# Patient Record
Sex: FEMALE | Race: BLACK OR AFRICAN AMERICAN | Employment: FULL TIME | URBAN - METROPOLITAN AREA
[De-identification: names, ages, dates, MRNs, and addresses within clinical notes are randomized per-mention and may not be internally consistent; named-entity substitution may affect disease eponyms.]

---

## 2017-08-17 ENCOUNTER — OFFICE VISIT (OUTPATIENT)
Dept: FAMILY MEDICINE CLINIC | Age: 32
End: 2017-08-17

## 2017-08-17 VITALS
HEART RATE: 66 BPM | OXYGEN SATURATION: 100 % | WEIGHT: 200 LBS | HEIGHT: 63 IN | BODY MASS INDEX: 35.44 KG/M2 | DIASTOLIC BLOOD PRESSURE: 81 MMHG | RESPIRATION RATE: 17 BRPM | TEMPERATURE: 98.6 F | SYSTOLIC BLOOD PRESSURE: 125 MMHG

## 2017-08-17 DIAGNOSIS — D50.9 MICROCYTIC ANEMIA: Primary | ICD-10-CM

## 2017-08-17 DIAGNOSIS — K21.9 GASTROESOPHAGEAL REFLUX DISEASE, ESOPHAGITIS PRESENCE NOT SPECIFIED: ICD-10-CM

## 2017-08-17 DIAGNOSIS — E66.9 OBESITY, UNSPECIFIED OBESITY SEVERITY, UNSPECIFIED OBESITY TYPE: ICD-10-CM

## 2017-08-17 DIAGNOSIS — Z29.9 PREVENTIVE MEASURE: ICD-10-CM

## 2017-08-17 NOTE — PROGRESS NOTES
Chief Complaint   Patient presents with    New Patient     1. Have you been to the ER, urgent care clinic since your last visit? Hospitalized since your last visit? No    2. Have you seen or consulted any other health care providers outside of the [address] [place] [address] since your last visit? Include any pap smears or colon screening.  No

## 2017-08-17 NOTE — PATIENT INSTRUCTIONS
Please contact our office if you have any questions about your visit today. 1.) Try and exercise at least three times a week. 2.) Please return in 1 week for next appointment. Iron Deficiency Anemia: Care Instructions  Your Care Instructions    Anemia means that you do not have enough red blood cells. Red blood cells carry oxygen around your body. When you have anemia, it can make you pale, weak, and tired. Many things can cause anemia. The most common cause is loss of blood. This can happen if you have heavy menstrual periods. It can also happen if you have bleeding in your stomach or bowel. You can also get anemia if you don't have enough iron in your diet or if it's hard for your body to absorb iron. In some cases, pregnancy causes anemia. That's because a pregnant woman needs more iron. Your doctor may do more tests to find the cause of your anemia. If a disease or other health problem is causing it, your doctor will treat that problem. It's important to follow up with your doctor to make sure that your iron level returns to normal.  Follow-up care is a key part of your treatment and safety. Be sure to make and go to all appointments, and call your doctor if you are having problems. It's also a good idea to know your test results and keep a list of the medicines you take. How can you care for yourself at home? · If your doctor recommended iron pills, take them as directed. ¨ Try to take the pills on an empty stomach. You can do this about 1 hour before or 2 hours after meals. But you may need to take iron with food to avoid an upset stomach. ¨ Do not take antacids or drink milk or anything with caffeine within 2 hours of when you take your iron. They can keep your body from absorbing the iron well. ¨ Vitamin C helps your body absorb iron. You may want to take iron pills with a glass of orange juice or some other food high in vitamin C.  ¨ Iron pills may cause stomach problems.  These include heartburn, nausea, diarrhea, constipation, and cramps. It can help to drink plenty of fluids and include fruits, vegetables, and fiber in your diet. ¨ It's normal for iron pills to make your stool a greenish or grayish black. But internal bleeding can also cause dark stool. So it's important to tell your doctor about any color changes. ¨ Call your doctor if you think you are having a problem with your iron pills. Even after you start to feel better, it will take several months for your body to build up its supply of iron. ¨ If you miss a pill, don't take a double dose. ¨ Keep iron pills out of the reach of small children. Too much iron can be very dangerous. · Eat foods with a lot of iron. These include red meat, shellfish, poultry, and eggs. They also include beans, raisins, whole-grain bread, and leafy green vegetables. · Steam your vegetables. This is the best way to prepare them if you want to get as much iron as possible. · Be safe with medicines. Do not take nonsteroidal anti-inflammatory pain relievers unless your doctor tells you to. These include aspirin, naproxen (Aleve), and ibuprofen (Advil, Motrin). · Liquid iron can stain your teeth. But you can mix it with water or juice and drink it with a straw. Then it won't get on your teeth. When should you call for help? Call 911 anytime you think you may need emergency care. For example, call if:  · You passed out (lost consciousness). · You vomit blood or what looks like coffee grounds. · You pass maroon or very bloody stools. Call your doctor now or seek immediate medical care if:  · Your stools are black and look like tar, or they have streaks of blood. · You are dizzy or lightheaded, or you feel like you may faint. Watch closely for changes in your health, and be sure to contact your doctor if:  · Your fatigue and weakness continue or get worse.   · You have side effects from taking iron pills, such as nausea, vomiting, constipation, diarrhea, or heartburn. · You do not get better as expected. Where can you learn more? Go to http://viraj-devora.info/. Enter X731 in the search box to learn more about \"Iron Deficiency Anemia: Care Instructions. \"  Current as of: October 13, 2016  Content Version: 11.3  © 5506-2640 [employer]. Care instructions adapted under license by [employer] (which disclaims liability or warranty for this information). If you have questions about a medical condition or this instruction, always ask your healthcare professional. [place] [address] any warranty or liability for your use of this information.

## 2017-08-17 NOTE — PROGRESS NOTES
History and Physical    Patient: Ximena Cody MRN: [id number]  SSN: [id number]    YOB: 1985  Age: 28 y.o. Sex: female      Subjective: Ximena Cody is a 28 y.o. female who is here to establish care. The patient last saw Dr. Eddie Davis in [place]. She mentions that over the past 5 years she has been dealing with anemia. She mentions that her Hgb is around an 8 or 9. She has been dealing with fibroids for a number of years. She admits to very heavy menses. She mentions that she was on birth control in the past and is unsure of her regular period. She mentions that slow release iron can make her feel very gassy. She states that she will sometimes slack off on the medication. She denies any constipation with the symptoms. She states that she works at [employer]. She mentions that she has been having flares of GERD from time to time. She states that she had Dennice Hint last Monday and it woke her up at night. She mentions that she has not been able to have a healthy diet. She mentions that she does eat late at night. She mentions that she has been slacking on her vegetable intake. She also mentions that she eats out every day. She states that she can break out into hives when she is exposed to Citrus products. She mentions that she only gets to exercise when she is at work. She states that she does not go to the gym or the [facility].        Past Medical History:   Diagnosis Date    Anemia     GERD (gastroesophageal reflux disease)     History of blood product transfusion      Past Surgical History:   Procedure Laterality Date    HX CARPAL TUNNEL RELEASE      bilat    HX MYOMECTOMY      HX RIGHT SALPINGO-OOPHORECTOMY        Family History   Problem Relation Age of Onset    Hypertension Father     Diabetes Father     Diabetes Maternal Grandmother     Hypertension Maternal Grandmother     Arthritis-rheumatoid Maternal Grandfather     Diabetes Paternal Grandmother     Hypertension Paternal Grandmother     Arthritis-rheumatoid Paternal Grandfather      Social History   Substance Use Topics    Smoking status: Never Smoker    Smokeless tobacco: Never Used    Alcohol use Yes      Comment: socially   -   -Works at [employer]        Prior to Admission medications    Not on File        Allergies   Allergen Reactions    Macrobid [Nitrofurantoin Monohyd/M-Cryst] Nausea and Vomiting    Morphine Itching    Vicodin [Hydrocodone-Acetaminophen] Nausea and Vomiting and Other (comments)   - Allergies to grass, pollen and mold. Review of Systems:  Constitutional: negative  Eyes: positive for contacts/glasses  Ears, Nose, Mouth, Throat, and Face: Negative   Respiratory: negative  Cardiovascular: negative  Gastrointestinal: negative  Genitourinary:negative  Integument/Breast: negative  Musculoskeletal:negative  Neurological: negative  Behavioral/Psychiatric: negative  Endocrine: negative    Objective:     Vitals:    08/17/17 1031   BP: 125/81   Pulse: 66   Resp: 17   Temp: 98.6 °F (37 °C)   TempSrc: Oral   SpO2: 100%   Weight: 200 lb (90.7 kg)   Height: 5' 3\" (1.6 m)        Physical Exam:  GENERAL: alert, cooperative, no distress, appears stated age, morbidly obese  EYE: conjunctivae/corneas clear. PERRL, EOM's intact. Fundi benign  LYMPHATIC: Cervical, supraclavicular, and axillary nodes normal.   THROAT & NECK: normal and no erythema or exudates noted. LUNG: clear to auscultation bilaterally  HEART: regular rate and rhythm, S1, S2 normal, no murmur, click, rub or gallop  ABDOMEN: soft, non-tender. Bowel sounds normal. No masses,  no organomegaly  EXTREMITIES:  extremities normal, atraumatic, no cyanosis or edema  SKIN: Normal.  NEUROLOGIC: AOx3. Gait normal. Reflexes and motor strength normal and symmetric. Cranial nerves 2-12 and sensation grossly intact.   PSYCHIATRIC: non focal  RECTAL: normal findings:  Within normal limits    Labs:     Labs ordered below     Assessment: 1.) Microcytic Anemia:Patient will cotinue on low-release iron. CBC w/ diff drawn in the office today. 2.) GERD: Patient instructed to take Zantac OT as needed    3.) Obesity[de-identified] Will discusses weight management on next visit. Patient will return in 1 week for follow up.       Plan:     Orders Placed This Encounter    SED RATE (ESR)    CBC WITH AUTOMATED DIFF    METABOLIC PANEL, COMPREHENSIVE    LIPID PANEL    TSH 3RD GENERATION    T4, FREE    HEMOGLOBIN A1C WITH EAG    VITAMIN D, 25 HYDROXY    MAGNESIUM    PHOSPHORUS    HIV 1/2 AG/AB, 4TH GENERATION,W RFLX CONFIRM                 Signed By: Zena Cr DO     August 17, 2017

## 2017-08-17 NOTE — MR AVS SNAPSHOT
Visit Information Date & Time Provider Department Dept. Phone Encounter #  
 8/17/2017 10:00 AM Frederic ChristoLisa [id number] Follow-up Instructions Return in about 1 week (around 8/24/2017) for Follow up on labs. Upcoming Health Maintenance Date Due DTaP/Tdap/Td series (1 - Tdap) 1/6/2006 PAP AKA CERVICAL CYTOLOGY 1/6/2006 INFLUENZA AGE 9 TO ADULT 8/1/2017 Allergies as of 8/17/2017  Review Complete On: 8/17/2017 By: Frederic Villafuerte DO Severity Noted Reaction Type Reactions Macrobid [Nitrofurantoin Monohyd/m-cryst]  08/17/2017    Nausea and Vomiting Morphine  08/17/2017    Itching Vicodin [Hydrocodone-acetaminophen]  08/17/2017    Nausea and Vomiting, Other (comments) Current Immunizations  Never Reviewed No immunizations on file. Not reviewed this visit You Were Diagnosed With   
  
 Codes Comments Gastroesophageal reflux disease, esophagitis presence not specified    -  Primary ICD-10-CM: K21.9 ICD-9-CM: 530.81 Microcytic anemia     ICD-10-CM: D50.9 ICD-9-CM: 280.9 Obesity, unspecified obesity severity, unspecified obesity type     ICD-10-CM: E66.9 ICD-9-CM: 278.00 Preventive measure     ICD-10-CM: Z29.9 ICD-9-CM: V07.9 Vitals BP Pulse Temp Resp Height(growth percentile) Weight(growth percentile) 125/81 (BP 1 Location: Right arm, BP Patient Position: Sitting) 66 98.6 °F (37 °C) (Oral) 17 5' 3\" (1.6 m) 200 lb (90.7 kg) SpO2 BMI OB Status Smoking Status 100% 35.43 kg/m2 Unknown Never Smoker BMI and BSA Data Body Mass Index Body Surface Area  
 35.43 kg/m 2 2.01 m 2 Your Updated Medication List  
  
Notice  As of 8/17/2017 11:20 AM  
 You have not been prescribed any medications. Follow-up Instructions Return in about 1 week (around 8/24/2017) for Follow up on labs. To-Do List   
 08/17/2017 Lab:  HEMOGLOBIN A1C WITH EAG   
  
 08/17/2017 Lab:  HIV 1/2 AG/AB, 4TH GENERATION,W RFLX CONFIRM   
  
 08/17/2017 Lab:  MAGNESIUM   
  
 08/17/2017 Lab:  PHOSPHORUS   
  
 08/17/2017 Lab:  VITAMIN D, 25 HYDROXY Around 11/15/2017 Lab:  CBC WITH AUTOMATED DIFF Around 11/15/2017 Lab:  LIPID PANEL Around 11/15/2017 Lab:  METABOLIC PANEL, COMPREHENSIVE Around 11/15/2017 Lab:  SED RATE (ESR) Around 11/15/2017 Lab:  T4, FREE Around 11/15/2017 Lab:  TSH 3RD GENERATION Patient Instructions Please contact our office if you have any questions about your visit today. 1.) Try and exercise at least three times a week. 2.) Please return in 1 week for next appointment. Iron Deficiency Anemia: Care Instructions Your Care Instructions Anemia means that you do not have enough red blood cells. Red blood cells carry oxygen around your body. When you have anemia, it can make you pale, weak, and tired. Many things can cause anemia. The most common cause is loss of blood. This can happen if you have heavy menstrual periods. It can also happen if you have bleeding in your stomach or bowel. You can also get anemia if you don't have enough iron in your diet or if it's hard for your body to absorb iron. In some cases, pregnancy causes anemia. That's because a pregnant woman needs more iron. Your doctor may do more tests to find the cause of your anemia. If a disease or other health problem is causing it, your doctor will treat that problem. It's important to follow up with your doctor to make sure that your iron level returns to normal. 
Follow-up care is a key part of your treatment and safety. Be sure to make and go to all appointments, and call your doctor if you are having problems. It's also a good idea to know your test results and keep a list of the medicines you take. How can you care for yourself at home? · If your doctor recommended iron pills, take them as directed. ¨ Try to take the pills on an empty stomach. You can do this about 1 hour before or 2 hours after meals. But you may need to take iron with food to avoid an upset stomach. ¨ Do not take antacids or drink milk or anything with caffeine within 2 hours of when you take your iron. They can keep your body from absorbing the iron well. ¨ Vitamin C helps your body absorb iron. You may want to take iron pills with a glass of orange juice or some other food high in vitamin C. 
¨ Iron pills may cause stomach problems. These include heartburn, nausea, diarrhea, constipation, and cramps. It can help to drink plenty of fluids and include fruits, vegetables, and fiber in your diet. ¨ It's normal for iron pills to make your stool a greenish or grayish black. But internal bleeding can also cause dark stool. So it's important to tell your doctor about any color changes. ¨ Call your doctor if you think you are having a problem with your iron pills. Even after you start to feel better, it will take several months for your body to build up its supply of iron. ¨ If you miss a pill, don't take a double dose. ¨ Keep iron pills out of the reach of small children. Too much iron can be very dangerous. · Eat foods with a lot of iron. These include red meat, shellfish, poultry, and eggs. They also include beans, raisins, whole-grain bread, and leafy green vegetables. · Steam your vegetables. This is the best way to prepare them if you want to get as much iron as possible. · Be safe with medicines. Do not take nonsteroidal anti-inflammatory pain relievers unless your doctor tells you to. These include aspirin, naproxen (Aleve), and ibuprofen (Advil, Motrin). · Liquid iron can stain your teeth. But you can mix it with water or juice and drink it with a straw. Then it won't get on your teeth. When should you call for help? Call 911 anytime you think you may need emergency care. For example, call if: 
· You passed out (lost consciousness). · You vomit blood or what looks like coffee grounds. · You pass maroon or very bloody stools. Call your doctor now or seek immediate medical care if: 
· Your stools are black and look like tar, or they have streaks of blood. · You are dizzy or lightheaded, or you feel like you may faint. Watch closely for changes in your health, and be sure to contact your doctor if: 
· Your fatigue and weakness continue or get worse. · You have side effects from taking iron pills, such as nausea, vomiting, constipation, diarrhea, or heartburn. · You do not get better as expected. Where can you learn more? Go to http://viraj-devora.info/. Enter Z314 in the search box to learn more about \"Iron Deficiency Anemia: Care Instructions. \" Current as of: October 13, 2016 Content Version: 11.3 © 2454-0792 [employer]. Care instructions adapted under license by [employer] (which disclaims liability or warranty for this information). If you have questions about a medical condition or this instruction, always ask your healthcare professional. [address] any warranty or liability for your use of this information. Introducing [facility]! Ben Phillip introduces PENRITH patient portal. Now you can access parts of your medical record, email your doctor's office, and request medication refills online. 1. In your internet browser, go to [email or web address] 2. Click on the First Time User? Click Here link in the Sign In box. You will see the New Member Sign Up page. 3. Enter your PENRITH Access Code exactly as it appears below. You will not need to use this code after youve completed the sign-up process. If you do not sign up before the expiration date, you must request a new code. · [employer] Access Code: [email or web address] Expires: 11/15/2017 10:14 AM 
 
4. Enter the last four digits of your Social Security Number (xxxx) and Date of Birth (mm/dd/yyyy) as indicated and click Submit. You will be taken to the next sign-up page. 5. Create a [employer] ID. This will be your [employer] login ID and cannot be changed, so think of one that is secure and easy to remember. 6. Create a [employer] password. You can change your password at any time. 7. Enter your Password Reset Question and Answer. This can be used at a later time if you forget your password. 8. Enter your e-mail address. You will receive e-mail notification when new information is available in [address] 9. Click Sign Up. You can now view and download portions of your medical record. 10. Click the Download Summary menu link to download a portable copy of your medical information. If you have questions, please visit the Frequently Asked Questions section of the [employer] website. Remember, [employer] is NOT to be used for urgent needs. For medical emergencies, dial 911. Now available from your iPhone and Android! Please provide this summary of care documentation to your next provider. Your primary care clinician is listed as [phone] Iniguez. If you have any questions after today's visit, please call [phone].

## 2017-08-18 LAB
25(OH)D3+25(OH)D2 SERPL-MCNC: 14.7 NG/ML (ref 30–100)
ALBUMIN SERPL-MCNC: 4.3 G/DL (ref 3.5–5.5)
ALBUMIN/GLOB SERPL: 1.5 {RATIO} (ref 1.2–2.2)
ALP SERPL-CCNC: 68 IU/L (ref 39–117)
ALT SERPL-CCNC: 20 IU/L (ref 0–32)
AST SERPL-CCNC: 23 IU/L (ref 0–40)
BASOPHILS # BLD AUTO: 0 X10E3/UL (ref 0–0.2)
BASOPHILS NFR BLD AUTO: 1 %
BILIRUB SERPL-MCNC: 0.4 MG/DL (ref 0–1.2)
BUN SERPL-MCNC: 11 MG/DL (ref 6–20)
BUN/CREAT SERPL: 16 (ref 9–23)
CALCIUM SERPL-MCNC: 9.3 MG/DL (ref 8.7–10.2)
CHLORIDE SERPL-SCNC: 103 MMOL/L (ref 96–106)
CHOLEST SERPL-MCNC: 206 MG/DL (ref 100–199)
CO2 SERPL-SCNC: 24 MMOL/L (ref 18–29)
CREAT SERPL-MCNC: 0.7 MG/DL (ref 0.57–1)
EOSINOPHIL # BLD AUTO: 0.2 X10E3/UL (ref 0–0.4)
EOSINOPHIL NFR BLD AUTO: 4 %
ERYTHROCYTE [DISTWIDTH] IN BLOOD BY AUTOMATED COUNT: 18.1 % (ref 12.3–15.4)
ERYTHROCYTE [SEDIMENTATION RATE] IN BLOOD BY WESTERGREN METHOD: 2 MM/HR (ref 0–32)
EST. AVERAGE GLUCOSE BLD GHB EST-MCNC: 105 MG/DL
GLOBULIN SER CALC-MCNC: 2.8 G/DL (ref 1.5–4.5)
GLUCOSE SERPL-MCNC: 84 MG/DL (ref 65–99)
HBA1C MFR BLD: 5.3 % (ref 4.8–5.6)
HCT VFR BLD AUTO: 40.4 % (ref 34–46.6)
HDLC SERPL-MCNC: 55 MG/DL
HGB BLD-MCNC: 12.6 G/DL (ref 11.1–15.9)
HIV 1+2 AB+HIV1 P24 AG SERPL QL IA: NON REACTIVE
IMM GRANULOCYTES # BLD: 0 X10E3/UL (ref 0–0.1)
IMM GRANULOCYTES NFR BLD: 0 %
INTERPRETATION, 910389: NORMAL
LDLC SERPL CALC-MCNC: 139 MG/DL (ref 0–99)
LYMPHOCYTES # BLD AUTO: 1.7 X10E3/UL (ref 0.7–3.1)
LYMPHOCYTES NFR BLD AUTO: 37 %
MAGNESIUM SERPL-MCNC: 2.1 MG/DL (ref 1.6–2.3)
MCH RBC QN AUTO: 26.8 PG (ref 26.6–33)
MCHC RBC AUTO-ENTMCNC: 31.2 G/DL (ref 31.5–35.7)
MCV RBC AUTO: 86 FL (ref 79–97)
MONOCYTES # BLD AUTO: 0.5 X10E3/UL (ref 0.1–0.9)
MONOCYTES NFR BLD AUTO: 12 %
NEUTROPHILS # BLD AUTO: 2.1 X10E3/UL (ref 1.4–7)
NEUTROPHILS NFR BLD AUTO: 46 %
PHOSPHATE SERPL-MCNC: 3.3 MG/DL (ref 2.5–4.5)
PLATELET # BLD AUTO: 239 X10E3/UL (ref 150–379)
POTASSIUM SERPL-SCNC: 4.3 MMOL/L (ref 3.5–5.2)
PROT SERPL-MCNC: 7.1 G/DL (ref 6–8.5)
RBC # BLD AUTO: 4.71 X10E6/UL (ref 3.77–5.28)
SODIUM SERPL-SCNC: 141 MMOL/L (ref 134–144)
T4 FREE SERPL-MCNC: 1.16 NG/DL (ref 0.82–1.77)
TRIGL SERPL-MCNC: 59 MG/DL (ref 0–149)
TSH SERPL DL<=0.005 MIU/L-ACNC: 1.04 UIU/ML (ref 0.45–4.5)
VLDLC SERPL CALC-MCNC: 12 MG/DL (ref 5–40)
WBC # BLD AUTO: 4.5 X10E3/UL (ref 3.4–10.8)

## 2017-08-24 ENCOUNTER — OFFICE VISIT (OUTPATIENT)
Dept: FAMILY MEDICINE CLINIC | Age: 32
End: 2017-08-24

## 2017-08-24 VITALS
SYSTOLIC BLOOD PRESSURE: 126 MMHG | HEIGHT: 63 IN | RESPIRATION RATE: 17 BRPM | BODY MASS INDEX: 35.26 KG/M2 | HEART RATE: 71 BPM | WEIGHT: 199 LBS | DIASTOLIC BLOOD PRESSURE: 84 MMHG | TEMPERATURE: 98.6 F | OXYGEN SATURATION: 96 %

## 2017-08-24 DIAGNOSIS — E66.9 OBESITY, UNSPECIFIED OBESITY SEVERITY, UNSPECIFIED OBESITY TYPE: Primary | ICD-10-CM

## 2017-08-24 DIAGNOSIS — K21.9 GASTROESOPHAGEAL REFLUX DISEASE, ESOPHAGITIS PRESENCE NOT SPECIFIED: ICD-10-CM

## 2017-08-24 DIAGNOSIS — E55.9 VITAMIN D DEFICIENCY: ICD-10-CM

## 2017-08-24 DIAGNOSIS — D50.9 MICROCYTIC ANEMIA: ICD-10-CM

## 2017-08-24 RX ORDER — ERGOCALCIFEROL 1.25 MG/1
50000 CAPSULE ORAL
Qty: 12 CAP | Refills: 6 | Status: SHIPPED | OUTPATIENT
Start: 2017-08-24 | End: 2018-07-27

## 2017-08-24 NOTE — MR AVS SNAPSHOT
Visit Information Date & Time Provider Department Dept. Phone Encounter #  
 8/24/2017  2:30 PM [id number] Lisa Hamlin 77 [id number] Follow-up Instructions Return in about 6 months (around 2/24/2018) for Follow Up . Upcoming Health Maintenance Date Due DTaP/Tdap/Td series (1 - Tdap) 1/6/2006 PAP AKA CERVICAL CYTOLOGY 1/6/2006 INFLUENZA AGE 9 TO ADULT 8/1/2017 Allergies as of 8/24/2017  Review Complete On: 8/64/1094 By: Marie Collins. Danielito Paulson LPN Severity Noted Reaction Type Reactions Macrobid [Nitrofurantoin Monohyd/m-cryst]  08/17/2017    Nausea and Vomiting Morphine  08/17/2017    Itching Vicodin [Hydrocodone-acetaminophen]  08/17/2017    Nausea and Vomiting, Other (comments) Current Immunizations  Never Reviewed No immunizations on file. Not reviewed this visit You Were Diagnosed With   
  
 Codes Comments Obesity, unspecified obesity severity, unspecified obesity type    -  Primary ICD-10-CM: E66.9 ICD-9-CM: 278.00 Vitamin D deficiency     ICD-10-CM: E55.9 ICD-9-CM: 268.9 Gastroesophageal reflux disease, esophagitis presence not specified     ICD-10-CM: K21.9 ICD-9-CM: 530.81 Microcytic anemia     ICD-10-CM: D50.9 ICD-9-CM: 280.9 Vitals BP Pulse Temp Resp Height(growth percentile) Weight(growth percentile) 126/84 (BP 1 Location: Right arm, BP Patient Position: Sitting) 71 98.6 °F (37 °C) (Oral) 17 5' 3\" (1.6 m) 199 lb (90.3 kg) SpO2 BMI OB Status Smoking Status 96% 35.25 kg/m2 Unknown Never Smoker BMI and BSA Data Body Mass Index Body Surface Area  
 35.25 kg/m 2 2 m 2 Preferred Pharmacy Pharmacy Name Phone [phone] [address], [address] AT [address] [phone] Your Updated Medication List  
  
   
This list is accurate as of: 8/24/17  3:15 PM.  Always use your most recent med list.  
  
  
  
  
 ergocalciferol 50,000 unit capsule Commonly known as:  ERGOCALCIFEROL Take 1 Cap by mouth every seven (7) days. Prescriptions Sent to Pharmacy Refills  
 ergocalciferol (ERGOCALCIFEROL) 50,000 unit capsule 6 Sig: Take 1 Cap by mouth every seven (7) days. Class: Normal  
 Pharmacy: [facility] [address] [place] [address] AT [address] Ph #: [phone] Route: Oral  
  
Follow-up Instructions Return in about 6 months (around 2/24/2018) for Follow Up . To-Do List   
 08/24/2017 Lab:  VITAMIN D, 25 HYDROXY Around 11/22/2017 Lab:  CBC WITH AUTOMATED DIFF Around 11/22/2017 Lab:  METABOLIC PANEL, COMPREHENSIVE Patient Instructions Please contact our office if you have any questions about your visit today. 1.) Please get at least three times a week of exercise at least 30 minutes at a time. 2.) Use Vitamin D once a week 50,000 units. 3.) Return in 6 months for follow up. Iron Deficiency Anemia in Children: Care Instructions Your Care Instructions Iron deficiency anemia means that your child doesn't have enough iron in his or her blood. Your child may not get enough iron from food. Or maybe your child's body can't absorb iron well. Another common cause is blood loss. A girl who loses blood from heavy periods may need more iron. So may a child who has bleeding in the stomach or bowel. Anemia gets worse slowly. You may not notice it right away. Your child may look pale. He or she may feel weak and tired. Your doctor may need to do more tests to find and treat the problem. Follow up with your doctor to make sure that your child's iron level goes back to normal. 
Follow-up care is a key part of your child's treatment and safety.  Be sure to make and go to all appointments, and call your doctor if your child is having problems. It's also a good idea to know your child's test results and keep a list of the medicines your child takes. How can you care for your child at home? · If your doctor recommended iron pills for your child, give them as directed. ¨ Try to give the pills on an empty stomach about 1 hour before or 2 hours after meals. But your child may need to take iron with food to avoid an upset stomach. ¨ Do not give your child antacids or let your child drink milk or caffeine drinks (such as coffee, tea, or cola) at the same time or within 2 hours of the time that your child takes iron pills. They can keep the body from absorbing the iron well. ¨ Vitamin C helps the body absorb iron. You may want to give iron pills with a glass of orange juice or some other food high in vitamin C. 
¨ Iron pills may cause stomach problems, such as heartburn, nausea, diarrhea, constipation, and cramps. Be sure your child drinks plenty of fluids. Include fruits, vegetables, and fiber in your child's diet each day. Iron pills can change the color of your child's stool to a greenish or grayish black. This is normal. But internal bleeding can also cause dark stool, so be sure to mention any color changes to your doctor. ¨ Call your doctor if you think your child is having a problem with the iron pills. Even after your child starts feeling better, it will take several months for the body to build up its supply of iron. ¨ If your child misses taking a pill on time, do not give a double dose of iron. ¨ Keep iron pills out of the reach of small children. An overdose of iron can be very dangerous. · Have your child eat foods rich in iron. These include red meat, shellfish, poultry, eggs, beans, raisins, whole-grain bread, and leafy green vegetables. · Steam vegetables to help them keep their iron content.  
· Do not give your child nonsteroidal anti-inflammatory pain relievers, such as aspirin, naproxen (Aleve), or ibuprofen (Advil, Motrin), unless your doctor tells you to. · Liquid forms of iron can stain your child's teeth. You can mix a dose of liquid iron in water, fruit juice, or tomato juice. Then let your child drink it with a straw so that it does not get on the teeth. When should you call for help? Call 911 anytime you think your child may need emergency care. For example, call if: 
· Your child passes out (loses consciousness). · Your child vomits blood or what looks like coffee grounds. · Your child passes maroon or very bloody stools. Call your doctor now or seek immediate medical care if: 
· Your child's stools are black and tarlike or have streaks of blood. · Your child is dizzy or lightheaded, or your child feels like he or she may faint. Watch closely for changes in your child's health, and be sure to contact your doctor if: 
· Your child's fatigue and weakness continue or get worse. · Your child has side effects from taking iron pills, such as nausea, vomiting, constipation, diarrhea, or heartburn. · Your child does not get better as expected. Where can you learn more? Go to http://viraj-devora.info/. Enter N070 in the search box to learn more about \"Iron Deficiency Anemia in Children: Care Instructions. \" Current as of: October 13, 2016 Content Version: 11.3 © 3711-4763 Appear Here. Care instructions adapted under license by [employer] (which disclaims liability or warranty for this information). If you have questions about a medical condition or this instruction, always ask your healthcare professional. [place] [address] any warranty or liability for your use of this information. Introducing [facility] [place] [facility]! [facility] introduces Tales2Go patient portal. Now you can access parts of your medical record, email your doctor's office, and request medication refills online. 1. In your internet browser, go to [email or web address]. [employer] 2. Click on the First Time User? Click Here link in the Sign In box. You will see the New Member Sign Up page. 3. Enter your DocVerse Access Code exactly as it appears below. You will not need to use this code after youve completed the sign-up process. If you do not sign up before the expiration date, you must request a new code. · DocVerse Access Code: [email or web address] Expires: 11/15/2017 10:14 AM 
 
4. Enter the last four digits of your Social Security Number (xxxx) and Date of Birth (mm/dd/yyyy) as indicated and click Submit. You will be taken to the next sign-up page. 5. Create a Schmoozert ID. This will be your DocVerse login ID and cannot be changed, so think of one that is secure and easy to remember. 6. Create a DocVerse password. You can change your password at any time. 7. Enter your Password Reset Question and Answer. This can be used at a later time if you forget your password. 8. Enter your e-mail address. You will receive e-mail notification when new information is available in [address] 9. Click Sign Up. You can now view and download portions of your medical record. 10. Click the Download Summary menu link to download a portable copy of your medical information. If you have questions, please visit the Frequently Asked Questions section of the DocVerse website. Remember, DocVerse is NOT to be used for urgent needs. For medical emergencies, dial 911. Now available from your iPhone and Android! Please provide this summary of care documentation to your next provider. Your primary care clinician is listed as Aleah Iniguez. If you have any questions after today's visit, please call [phone].

## 2017-08-24 NOTE — PROGRESS NOTES
Chief Complaint   Patient presents with    Anemia    Obesity     1. Have you been to the ER, urgent care clinic since your last visit? Hospitalized since your last visit? No    2. Have you seen or consulted any other health care providers outside of the [address] [place] [address] since your last visit? Include any pap smears or colon screening.  No

## 2017-08-24 NOTE — PATIENT INSTRUCTIONS
Please contact our office if you have any questions about your visit today. 1.) Please get at least three times a week of exercise at least 30 minutes at a time. 2.) Use Vitamin D once a week 50,000 units. 3.) Return in 6 months for follow up. Iron Deficiency Anemia in Children: Care Instructions  Your Care Instructions    Iron deficiency anemia means that your child doesn't have enough iron in his or her blood. Your child may not get enough iron from food. Or maybe your child's body can't absorb iron well. Another common cause is blood loss. A girl who loses blood from heavy periods may need more iron. So may a child who has bleeding in the stomach or bowel. Anemia gets worse slowly. You may not notice it right away. Your child may look pale. He or she may feel weak and tired. Your doctor may need to do more tests to find and treat the problem. Follow up with your doctor to make sure that your child's iron level goes back to normal.  Follow-up care is a key part of your child's treatment and safety. Be sure to make and go to all appointments, and call your doctor if your child is having problems. It's also a good idea to know your child's test results and keep a list of the medicines your child takes. How can you care for your child at home? · If your doctor recommended iron pills for your child, give them as directed. ¨ Try to give the pills on an empty stomach about 1 hour before or 2 hours after meals. But your child may need to take iron with food to avoid an upset stomach. ¨ Do not give your child antacids or let your child drink milk or caffeine drinks (such as coffee, tea, or cola) at the same time or within 2 hours of the time that your child takes iron pills. They can keep the body from absorbing the iron well. ¨ Vitamin C helps the body absorb iron.  You may want to give iron pills with a glass of orange juice or some other food high in vitamin C.  ¨ Iron pills may cause stomach problems, such as heartburn, nausea, diarrhea, constipation, and cramps. Be sure your child drinks plenty of fluids. Include fruits, vegetables, and fiber in your child's diet each day. Iron pills can change the color of your child's stool to a greenish or grayish black. This is normal. But internal bleeding can also cause dark stool, so be sure to mention any color changes to your doctor. ¨ Call your doctor if you think your child is having a problem with the iron pills. Even after your child starts feeling better, it will take several months for the body to build up its supply of iron. ¨ If your child misses taking a pill on time, do not give a double dose of iron. ¨ Keep iron pills out of the reach of small children. An overdose of iron can be very dangerous. · Have your child eat foods rich in iron. These include red meat, shellfish, poultry, eggs, beans, raisins, whole-grain bread, and leafy green vegetables. · Steam vegetables to help them keep their iron content. · Do not give your child nonsteroidal anti-inflammatory pain relievers, such as aspirin, naproxen (Aleve), or ibuprofen (Advil, Motrin), unless your doctor tells you to. · Liquid forms of iron can stain your child's teeth. You can mix a dose of liquid iron in water, fruit juice, or tomato juice. Then let your child drink it with a straw so that it does not get on the teeth. When should you call for help? Call 911 anytime you think your child may need emergency care. For example, call if:  · Your child passes out (loses consciousness). · Your child vomits blood or what looks like coffee grounds. · Your child passes maroon or very bloody stools. Call your doctor now or seek immediate medical care if:  · Your child's stools are black and tarlike or have streaks of blood. · Your child is dizzy or lightheaded, or your child feels like he or she may faint.   Watch closely for changes in your child's health, and be sure to contact your doctor if:  · Your child's fatigue and weakness continue or get worse. · Your child has side effects from taking iron pills, such as nausea, vomiting, constipation, diarrhea, or heartburn. · Your child does not get better as expected. Where can you learn more? Go to http://viraj-devora.info/. Enter S798 in the search box to learn more about \"Iron Deficiency Anemia in Children: Care Instructions. \"  Current as of: October 13, 2016  Content Version: 11.3  © 2394-0595 GetGoing. Care instructions adapted under license by [employer] (which disclaims liability or warranty for this information). If you have questions about a medical condition or this instruction, always ask your healthcare professional. [address] any warranty or liability for your use of this information.

## 2017-08-24 NOTE — PROGRESS NOTES
History and Physical    Patient: Danika Skinner MRN: [id number]  SSN: [id number]    YOB: 1985  Age: 28 y.o. Sex: female      Subjective: Danika Skinner is a 28 y.o. female who is here for follow up. She would like to review her labs. She mentions that she does not exercise as much as she would like. She does not mention any chest pain, abdominal pain or shortness of breath. Past Medical History:   Diagnosis Date    Anemia     GERD (gastroesophageal reflux disease)     History of blood product transfusion         Allergies   Allergen Reactions    Macrobid [Nitrofurantoin Monohyd/M-Cryst] Nausea and Vomiting    Morphine Itching    Vicodin [Hydrocodone-Acetaminophen] Nausea and Vomiting and Other (comments)   - Allergies to grass, pollen and mold. Review of Systems:  Pertinent ROS noted in HPI    Objective:     Visit Vitals    /84 (BP 1 Location: Right arm, BP Patient Position: Sitting)    Pulse 71    Temp 98.6 °F (37 °C) (Oral)    Resp 17    Ht 5' 3\" (1.6 m)    Wt 199 lb (90.3 kg)    SpO2 96%    BMI 35.25 kg/m2     Physical Exam:  GENERAL: alert, cooperative, no distress, appears stated age, morbidly obese  EYE: conjunctivae/corneas clear. PERRL, EOM's intact. Fundi benign  THROAT & NECK: normal and no erythema or exudates noted. LUNG: clear to auscultation bilaterally  HEART: regular rate and rhythm, S1, S2 normal, no murmur, click, rub or gallop  ABDOMEN: soft, non-tender.  Bowel sounds normal. No masses,  no organomegaly, obese   EXTREMITIES:  extremities normal, atraumatic, no cyanosis or edema  SKIN: Normal.        Labs:     Lab Results   Component Value Date/Time    VITAMIN D, 25-HYDROXY 14.7 08/17/2017 11:21 AM         Lab Results   Component Value Date/Time    VITAMIN D, 25-HYDROXY 14.7 08/17/2017 11:21 AM       Lab Results   Component Value Date/Time    WBC 4.5 08/17/2017 11:21 AM    HGB 12.6 08/17/2017 11:21 AM    HCT 40.4 08/17/2017 11:21 AM    PLATELET 239 08/17/2017 11:21 AM    MCV 86 08/17/2017 11:21 AM     Lab Results   Component Value Date/Time    Sodium 141 08/17/2017 11:21 AM    Potassium 4.3 08/17/2017 11:21 AM    Chloride 103 08/17/2017 11:21 AM    CO2 24 08/17/2017 11:21 AM    Glucose 84 08/17/2017 11:21 AM    BUN 11 08/17/2017 11:21 AM    Creatinine 0.70 08/17/2017 11:21 AM    BUN/Creatinine ratio 16 08/17/2017 11:21 AM    GFR est  08/17/2017 11:21 AM    GFR est non- 08/17/2017 11:21 AM    Calcium 9.3 08/17/2017 11:21 AM    Bilirubin, total 0.4 08/17/2017 11:21 AM    AST (SGOT) 23 08/17/2017 11:21 AM    Alk. phosphatase 68 08/17/2017 11:21 AM    Protein, total 7.1 08/17/2017 11:21 AM    Albumin 4.3 08/17/2017 11:21 AM    A-G Ratio 1.5 08/17/2017 11:21 AM    ALT (SGPT) 20 08/17/2017 11:21 AM         Assessment:     1.) Vitamin D deficiency: Patient placed on once weekly Vitamin D 50,000 units. 2.) Microcytic Anemia:Patient will continue on low-release iron. 3.) GERD: Patient can continue to take Zantac OTC as needed    4.) Obesity: Patient given information on weight loss, exercise and diet. I personally reviewed all labs with the patient. Patient will return in 6 months for follow up.       Plan:     Orders Placed This Encounter    VITAMIN D, 25 HYDROXY    METABOLIC PANEL, COMPREHENSIVE    CBC WITH AUTOMATED DIFF    ergocalciferol (ERGOCALCIFEROL) 50,000 unit capsule           Signed By: Zena Cr DO     August 24, 2017

## 2018-05-10 ENCOUNTER — OFFICE VISIT (OUTPATIENT)
Dept: FAMILY MEDICINE CLINIC | Age: 33
End: 2018-05-10

## 2018-05-10 VITALS
SYSTOLIC BLOOD PRESSURE: 127 MMHG | HEIGHT: 63 IN | OXYGEN SATURATION: 95 % | RESPIRATION RATE: 16 BRPM | HEART RATE: 67 BPM | WEIGHT: 198 LBS | DIASTOLIC BLOOD PRESSURE: 77 MMHG | BODY MASS INDEX: 35.08 KG/M2 | TEMPERATURE: 97.4 F

## 2018-05-10 DIAGNOSIS — E55.9 VITAMIN D DEFICIENCY: ICD-10-CM

## 2018-05-10 DIAGNOSIS — D64.9 ANEMIA, UNSPECIFIED TYPE: ICD-10-CM

## 2018-05-10 DIAGNOSIS — R53.83 FATIGUE, UNSPECIFIED TYPE: Primary | ICD-10-CM

## 2018-05-10 DIAGNOSIS — R53.83 FATIGUE, UNSPECIFIED TYPE: ICD-10-CM

## 2018-05-10 DIAGNOSIS — J30.89 ENVIRONMENTAL AND SEASONAL ALLERGIES: ICD-10-CM

## 2018-05-10 LAB
HBA1C MFR BLD HPLC: 5.2 %
HGB BLD-MCNC: 8.9 G/DL

## 2018-05-10 RX ORDER — DOCUSATE SODIUM 100 MG/1
100 CAPSULE, LIQUID FILLED ORAL 2 TIMES DAILY
Qty: 60 CAP | Refills: 2 | Status: SHIPPED | OUTPATIENT
Start: 2018-05-10 | End: 2018-07-27

## 2018-05-10 RX ORDER — BISMUTH SUBSALICYLATE 262 MG
1 TABLET,CHEWABLE ORAL DAILY
COMMUNITY
End: 2018-07-27

## 2018-05-10 RX ORDER — FERROUS SULFATE 325(65) MG
325 TABLET, DELAYED RELEASE (ENTERIC COATED) ORAL
Qty: 90 TAB | Refills: 0 | Status: SHIPPED | OUTPATIENT
Start: 2018-05-10 | End: 2018-05-17 | Stop reason: ALTCHOICE

## 2018-05-10 NOTE — PROGRESS NOTES
Chief Complaint   [facility]   [facility]     Patient requesting labs to check blood work. Patient states that she believes that her iron levels were low and doubled up on vitamins and is feeling a little better. 1. Have you been to the ER, urgent care clinic since your last visit? Hospitalized since your last visit? No    2. Have you seen or consulted any other health care providers outside of the [address] [place] [address] since your last visit? Include any pap smears or colon screening. No     Health Maintenance Due   Topic Date Due    DTaP/Tdap/Td series (1 - Tdap) 01/06/2006    PAP AKA CERVICAL CYTOLOGY  01/06/2006     Patient states that she had a PAP smear last year and is aware that she is due. Patient has been informed that she can bring in print out to add to record.

## 2018-05-10 NOTE — PATIENT INSTRUCTIONS
Please contact our office if you have any questions about your visit today. Learning About Vitamin D  Why is it important to get enough vitamin D? Your body needs vitamin D to absorb calcium. Calcium keeps your bones and muscles, including your heart, healthy and strong. If your muscles don't get enough calcium, they can cramp, hurt, or feel weak. You may have long-term (chronic) muscle aches and pains. If you don't get enough vitamin D throughout life, you have an increased chance of having thin and brittle bones (osteoporosis) in your later years. Children who don't get enough vitamin D may not grow as much as others their age. They also have a chance of getting a rare disease called rickets. It causes weak bones. Vitamin D and calcium are added to many foods. And your body uses sunshine to make its own vitamin D. How much vitamin D do you need? The [place] of [employer] recommends that people ages 3 through 79 get 600 IU (international units) every day. Adults 71 and older need 800 IU every day. Blood tests for vitamin D can check your vitamin D level. But there is no standard normal range used by all laboratories. The [place] of Medicine recommends a blood level of 20 ng/mL of vitamin D for healthy bones. And most people in the [place] and [facility] ([facility]) meet this goal.  How can you get more vitamin D? Foods that contain vitamin D include:  · [place], tuna, and mackerel. These are some of the best foods to eat when you need to get more vitamin D.  · Cheese, egg yolks, and beef liver. These foods have vitamin D in small amounts. · Milk, soy drinks, orange juice, yogurt, margarine, and some kinds of cereal have vitamin D added to them. Some people don't make vitamin D as well as others. They may have to take extra care in getting enough vitamin D. Things that reduce how much vitamin D your body makes include:  · Dark skin, such as many  Americans have.   · Age, especially if you are older than 65. · Digestive problems, such as Crohn's or celiac disease. · Liver and kidney disease. Some people who do not get enough vitamin D may need supplements. Are there any risks from taking vitamin D?  · Too much vitamin D:  ¨ Can damage your kidneys. ¨ Can cause nausea and vomiting, constipation, and weakness. ¨ Raises the amount of calcium in your blood. If this happens, you can get confused or have an irregular heart rhythm. · Vitamin D may interact with other medicines. Tell your doctor about all of the medicines you take, including over-the-counter drugs, herbs, and pills. Tell your doctor about all of your current medical problems. Where can you learn more? Go to http://virajTripdadevora.info/. Enter [id number] in the search box to learn more about \"Learning About Vitamin D.\"  Current as of: May 12, 2017  Content Version: 11.4  © 8833-0104 [employer]. Care instructions adapted under license by [employer] (which disclaims liability or warranty for this information). If you have questions about a medical condition or this instruction, always ask your healthcare professional. [place] [address] any warranty or liability for your use of this information. Iron-Rich Diet: Care Instructions  Your Care Instructions    Your body needs iron to make hemoglobin. Hemoglobin is a substance in red blood cells that carries oxygen from the lungs to cells all through your body. If you do not get enough iron, your body makes fewer and smaller red blood cells. As a result, your body's cells may not get enough oxygen. Adult men need 8 milligrams of iron a day; adult women need 18 milligrams of iron a day. After menopause, women need 8 milligrams of iron a day. A pregnant woman needs 27 milligrams of iron a day. Infants and young children have higher iron needs relative to their size than other age groups.  People who have lost blood because of ulcers or heavy menstrual periods may become very low in iron and may develop anemia. Most people can get the iron their bodies need by eating enough of certain iron-rich foods. Your doctor may recommend that you take an iron supplement along with eating an iron-rich diet. Follow-up care is a key part of your treatment and safety. Be sure to make and go to all appointments, and call your doctor if you are having problems. It's also a good idea to know your test results and keep a list of the medicines you take. How can you care for yourself at home? · Make iron-rich foods a part of your daily diet. Iron-rich foods include:  ¨ All meats, such as chicken, beef, lamb, pork, fish, and shellfish. Liver is especially high in iron. ¨ Leafy green vegetables. ¨ Raisins, peas, beans, lentils, barley, and eggs. ¨ Iron-fortified breakfast cereals. · Eat foods with vitamin C along with iron-rich foods. Vitamin C helps you absorb more iron from food. Drink a glass of orange juice or another citrus juice with your food. · Eat meat and vegetables or grains together. The iron in meat helps your body absorb the iron in other foods. Where can you learn more? Go to http://viraj-devora.info/. Enter [id number] in the search box to learn more about \"Iron-Rich Diet: Care Instructions. \"  Current as of: May 12, 2017  Content Version: 11.4  © 8997-3852 [employer]. Care instructions adapted under license by [employer] (which disclaims liability or warranty for this information). If you have questions about a medical condition or this instruction, always ask your healthcare professional. [place] [address] any warranty or liability for your use of this information.

## 2018-05-10 NOTE — PROGRESS NOTES
ALONZO  Reinaldo Tam is a 35 y.o. female  Chief Complaint   Patient presents with    Follow-up    Labs     Patient requesting labs to check blood work. Patient states that she believes that her iron levels were low and doubled up on vitamins and is feeling a little better. Reports tiredness and fatigue since Monday as she started her period and this one was heavy. Reports on Monday she also had some chills and an episode of dizziness. Denies dizziness on today's visit. Denies confusion or LOC. Reports she thinks her blood is low. Reports she is still on her menses but states her flow has slowed. Reports she was suppose to be taking an iron pill and a multi-vitamin but reports she stopped awhile ago. Reports excessive gas and very mild constipation with iron pill. Admits to not following up as previously recommended. Reports she did double up on her iron and multi-vitamin the last three days and states she feels a little better. Reports eating a lot of chicken. Reports she also has a vitamin D deficiency but states she is out of vitamin D. Reports she thinks her tiredness may also be related to the vitamin D. Reports she currently has some allergy symptoms. Reports using Zyrtec that is effective. Past Medical History  Past Medical History:   Diagnosis Date    Anemia     GERD (gastroesophageal reflux disease)     History of blood product transfusion        Surgical History  Past Surgical History:   Procedure Laterality Date    HX CARPAL TUNNEL RELEASE      bilat    HX MYOMECTOMY      HX RIGHT SALPINGO-OOPHORECTOMY          Medications  Current Outpatient Prescriptions   Medication Sig Dispense Refill    multivitamin (ONE A DAY) tablet Take 1 Tab by mouth daily.  ferrous sulfate (IRON) 325 mg (65 mg iron) EC tablet Take 1 Tab by mouth three (3) times daily (with meals). 90 Tab 0    ergocalciferol (ERGOCALCIFEROL) 50,000 unit capsule Take 1 Cap by mouth every seven (7) days.  12 Cap 6 Allergies  Allergies   Allergen Reactions    Macrobid [Nitrofurantoin Monohyd/M-Cryst] Nausea and Vomiting    Morphine Itching    Vicodin [Hydrocodone-Acetaminophen] Nausea and Vomiting and Other (comments)       Family History  Family History   Problem Relation Age of Onset    Hypertension Father     Diabetes Father     Diabetes Maternal Grandmother     Hypertension Maternal Grandmother     Arthritis-rheumatoid Maternal Grandfather     Diabetes Paternal Grandmother     Hypertension Paternal Grandmother     Arthritis-rheumatoid Paternal Grandfather        Social History  Social History     Social History    Marital status:      Spouse name: N/A    Number of children: N/A    Years of education: N/A     Occupational History    Not on file. Social History Main Topics    Smoking status: Never Smoker    Smokeless tobacco: Never Used    Alcohol use Yes      Comment: socially    Drug use: No    Sexual activity: Yes     Other Topics Concern    Not on file     Social History Narrative       Problem List  Patient Active Problem List   Diagnosis Code    Gastroesophageal reflux disease K21.9    Obesity E66.9    Microcytic anemia D50.9    Preventive measure Z29.9       Review of Systems  Review of Systems   Constitutional: Positive for chills and malaise/fatigue. Respiratory: Negative for shortness of breath. Cardiovascular: Negative for chest pain. Musculoskeletal: Negative for myalgias. Neurological: Positive for dizziness. Negative for loss of consciousness. Vital Signs  Vitals:    05/10/18 1429   BP: 127/77   Pulse: 67   Resp: 16   Temp: 97.4 °F (36.3 °C)   TempSrc: Oral   SpO2: 95%   Weight: 198 lb (89.8 kg)   Height: 5' 3\" (1.6 m)   PainSc:   0 - No pain   LMP: 05/05/2018       Physical Exam  Physical Exam   Constitutional: She is oriented to person, place, and time. HENT:   Nose: Mucosal edema present. No rhinorrhea.  Right sinus exhibits no maxillary sinus tenderness and no frontal sinus tenderness. Left sinus exhibits no maxillary sinus tenderness and no frontal sinus tenderness. Mouth/Throat: Oropharynx is clear and moist.   Cardiovascular: Normal rate, regular rhythm and normal heart sounds. No murmur heard. Pulmonary/Chest: Effort normal and breath sounds normal. No respiratory distress. She has no wheezes. Neurological: She is alert and oriented to person, place, and time. Diagnostics  Orders Placed This Encounter    CBC WITH AUTOMATED DIFF     Standing Status:   Future     Standing Expiration Date:   5/11/2019    IRON PROFILE     Standing Status:   Future     Standing Expiration Date:   5/11/2019    FERRITIN     Standing Status:   Future     Standing Expiration Date:   5/11/2019    VITAMIN B12     Standing Status:   Future     Standing Expiration Date:   5/11/2019    FOLATE     Standing Status:   Future     Standing Expiration Date:   5/11/2019    AMB POC HEMOGLOBIN A1C    AMB POC HEMOGLOBIN (HGB)    DISCONTD: ferrous sulfate, dried (IRON, DRIED, PO)     Sig: Take  by mouth.  multivitamin (ONE A DAY) tablet     Sig: Take 1 Tab by mouth daily.  ferrous sulfate (IRON) 325 mg (65 mg iron) EC tablet     Sig: Take 1 Tab by mouth three (3) times daily (with meals). Dispense:  90 Tab     Refill:  0       Results  Results for orders placed or performed in visit on 05/10/18   AMB POC HEMOGLOBIN A1C   Result Value Ref Range    Hemoglobin A1c (POC) 5.2 %   AMB POC HEMOGLOBIN (HGB)   Result Value Ref Range    Hemoglobin (POC) 8.9            Assessment and Plan  Diagnoses and all orders for this visit:    1. Fatigue, unspecified type  -     AMB POC HEMOGLOBIN A1C  -     AMB POC HEMOGLOBIN (HGB)  -     CBC WITH AUTOMATED DIFF; Future  -     IRON PROFILE; Future  -     FERRITIN; Future    2. Environmental and seasonal allergies    3. Anemia, unspecified type  -     ferrous sulfate (IRON) 325 mg (65 mg iron) EC tablet;  Take 1 Tab by mouth three (3) times daily (with meals). -     VITAMIN B12; Future  -     FOLATE; Future    4. Vitamin D deficiency      Discussed diet and foods high in iron. Discussed vitamin D. Continue with Zyrtec for allergies. Complete Vitamin D lab. Asymptomatic currently. Will have patient redraw labs tomorrow. Discussed alarm symptoms with patient. She is aware to seek emergency assistance if they occur. Medication (colase and ferrous sulfate) side effects, possible allergic reactions and warnings reviewed with patient. Patient verbalized understanding. After care summary printed and reviewed with patient. Plan reviewed with patient. Questions answered. Patient verbalized understanding of plan and is in agreement with plan. Patient to follow up in one week with her PCP or earlier if symptoms worsen.      JUDY WolfC

## 2018-05-10 NOTE — MR AVS SNAPSHOT
[facility] 
 
 
 [place] [address] [id number] [address] [address] [phone] Patient: Alan Dumont MRN: [id number] Visit Information Date & Time Provider Department Dept. Phone Encounter #  
 5/10/2018  2:15 PM Janell Ivan NP [address] [phone] [id number] Upcoming Health Maintenance Date Due DTaP/Tdap/Td series (1 - Tdap) 1/6/2006 PAP AKA CERVICAL CYTOLOGY 1/6/2006 Influenza Age 5 to Adult 8/1/2018 Allergies as of 5/10/2018  Review Complete On: 5/10/2018 By: Aliyah Fernández LPN Severity Noted Reaction Type Reactions Macrobid [Nitrofurantoin Monohyd/m-cryst]  08/17/2017    Nausea and Vomiting Morphine  08/17/2017    Itching Vicodin [Hydrocodone-acetaminophen]  08/17/2017    Nausea and Vomiting, Other (comments) Current Immunizations  Never Reviewed No immunizations on file. Not reviewed this visit You Were Diagnosed With   
  
 Codes Comments Fatigue, unspecified type    -  Primary ICD-10-CM: R53.83 ICD-9-CM: 780.79 Environmental and seasonal allergies     ICD-10-CM: J30.89 ICD-9-CM: 477.8 Anemia, unspecified type     ICD-10-CM: D64.9 ICD-9-CM: 747. 9 Vitamin D deficiency     ICD-10-CM: E55.9 ICD-9-CM: 268.9 Vitals BP Pulse Temp Resp Height(growth percentile) Weight(growth percentile) 127/77 (BP 1 Location: Right arm, BP Patient Position: Sitting) 67 97.4 °F (36.3 °C) (Oral) 16 5' 3\" (1.6 m) 198 lb (89.8 kg) LMP SpO2 BMI OB Status Smoking Status 05/05/2018 (Exact Date) 95% 35.07 kg/m2 Unknown Never Smoker BMI and BSA Data Body Mass Index Body Surface Area 35.07 kg/m 2 2 m 2 Preferred Pharmacy Pharmacy Name Phone [phone] [facility], [address] AT [address] [phone] Your Updated Medication List  
  
   
 This list is accurate as of 5/10/18  3:19 PM.  Always use your most recent med list.  
  
  
  
  
 docusate sodium 100 mg capsule Commonly known as:  Giovanni Avi Take 1 Cap by mouth two (2) times a day for 90 days. ergocalciferol 50,000 unit capsule Commonly known as:  ERGOCALCIFEROL Take 1 Cap by mouth every seven (7) days. ferrous sulfate 325 mg (65 mg iron) EC tablet Commonly known as:  IRON Take 1 Tab by mouth three (3) times daily (with meals). multivitamin tablet Commonly known as:  ONE A DAY Take 1 Tab by mouth daily. Prescriptions Sent to Pharmacy Refills  
 ferrous sulfate (IRON) 325 mg (65 mg iron) EC tablet 0 Sig: Take 1 Tab by mouth three (3) times daily (with meals). Class: Normal  
 Pharmacy: [facility] [place] [address] AT [address] [place] [address] Rd [address] Ph #: [phone] Route: Oral  
 docusate sodium (COLACE) 100 mg capsule 2 Sig: Take 1 Cap by mouth two (2) times a day for 90 days. Class: Normal  
 Pharmacy: [facility] [place] [address] AT [address] [place] [address] Rd [address] Ph #: [phone] Route: Oral  
  
We Performed the Following AMB POC HEMOGLOBIN (HGB) [21001 CPT(R)] AMB POC HEMOGLOBIN A1C [41000 CPT(R)] To-Do List   
 05/10/2018 Lab:  CBC WITH AUTOMATED DIFF   
  
 05/10/2018 Lab:  FERRITIN   
  
 05/10/2018 Lab:  FOLATE   
  
 05/10/2018 Lab:  IRON PROFILE   
  
 05/10/2018 Lab:  VITAMIN B12 Patient Instructions Please contact our office if you have any questions about your visit today. Learning About Vitamin D Why is it important to get enough vitamin D? Your body needs vitamin D to absorb calcium. Calcium keeps your bones and muscles, including your heart, healthy and strong. If your muscles don't get enough calcium, they can cramp, hurt, or feel weak.  You may have long-term (chronic) muscle aches and pains. If you don't get enough vitamin D throughout life, you have an increased chance of having thin and brittle bones (osteoporosis) in your later years. Children who don't get enough vitamin D may not grow as much as others their age. They also have a chance of getting a rare disease called rickets. It causes weak bones. Vitamin D and calcium are added to many foods. And your body uses sunshine to make its own vitamin D. How much vitamin D do you need? The [place] of Medicine recommends that people ages 3 through 79 get 600 IU (international units) every day. Adults 71 and older need 800 IU every day. Blood tests for vitamin D can check your vitamin D level. But there is no standard normal range used by all laboratories. The [place] of [employer] recommends a blood level of 20 ng/mL of vitamin D for healthy bones. And most people in the [place] and [facility] ([facility]) meet this goal. 
How can you get more vitamin D? Foods that contain vitamin D include: 
· [place], tuna, and mackerel. These are some of the best foods to eat when you need to get more vitamin D. 
· Cheese, egg yolks, and beef liver. These foods have vitamin D in small amounts. · Milk, soy drinks, orange juice, yogurt, margarine, and some kinds of cereal have vitamin D added to them. Some people don't make vitamin D as well as others. They may have to take extra care in getting enough vitamin D. Things that reduce how much vitamin D your body makes include: · Dark skin, such as many  Americans have. · Age, especially if you are older than 72. · Digestive problems, such as Crohn's or celiac disease. · Liver and kidney disease. Some people who do not get enough vitamin D may need supplements. Are there any risks from taking vitamin D? 
· Too much vitamin D: 
¨ Can damage your kidneys. ¨ Can cause nausea and vomiting, constipation, and weakness. ¨ Raises the amount of calcium in your blood. If this happens, you can get confused or have an irregular heart rhythm. · Vitamin D may interact with other medicines. Tell your doctor about all of the medicines you take, including over-the-counter drugs, herbs, and pills. Tell your doctor about all of your current medical problems. Where can you learn more? Go to http://viraj-devora.info/. Enter [id number] in the search box to learn more about \"Learning About Vitamin D.\" 
Current as of: May 12, 2017 Content Version: 11.4 © 2491-5809 [employer]. Care instructions adapted under license by [employer] (which disclaims liability or warranty for this information). If you have questions about a medical condition or this instruction, always ask your healthcare professional. [address] any warranty or liability for your use of this information. Iron-Rich Diet: Care Instructions Your Care Instructions Your body needs iron to make hemoglobin. Hemoglobin is a substance in red blood cells that carries oxygen from the lungs to cells all through your body. If you do not get enough iron, your body makes fewer and smaller red blood cells. As a result, your body's cells may not get enough oxygen. Adult men need 8 milligrams of iron a day; adult women need 18 milligrams of iron a day. After menopause, women need 8 milligrams of iron a day. A pregnant woman needs 27 milligrams of iron a day. Infants and young children have higher iron needs relative to their size than other age groups. People who have lost blood because of ulcers or heavy menstrual periods may become very low in iron and may develop anemia. Most people can get the iron their bodies need by eating enough of certain iron-rich foods. Your doctor may recommend that you take an iron supplement along with eating an iron-rich diet. Follow-up care is a key part of your treatment and safety. Be sure to make and go to all appointments, and call your doctor if you are having problems. It's also a good idea to know your test results and keep a list of the medicines you take. How can you care for yourself at home? · Make iron-rich foods a part of your daily diet. Iron-rich foods include: ¨ All meats, such as chicken, beef, lamb, pork, fish, and shellfish. Liver is especially high in iron. ¨ Leafy green vegetables. ¨ Raisins, peas, beans, lentils, barley, and eggs. ¨ Iron-fortified breakfast cereals. · Eat foods with vitamin C along with iron-rich foods. Vitamin C helps you absorb more iron from food. Drink a glass of orange juice or another citrus juice with your food. · Eat meat and vegetables or grains together. The iron in meat helps your body absorb the iron in other foods. Where can you learn more? Go to http://viraj-devora.info/. Enter [id number] in the search box to learn more about \"Iron-Rich Diet: Care Instructions. \" Current as of: May 12, 2017 Content Version: 11.4 © 6857-5628 [employer]. Care instructions adapted under license by [employer] (which disclaims liability or warranty for this information). If you have questions about a medical condition or this instruction, always ask your healthcare professional. [place] [address] any warranty or liability for your use of this information. Introducing \[facility]\"" & HEALTH SERVICES! Willadean Saint introduces [employer] patient portal. Now you can access parts of your medical record, email your doctor's office, and request medication refills online. 1. In your internet browser, go to [email or web address] 2. Click on the First Time User? Click Here link in the Sign In box. You will see the New Member Sign Up page. 3. Enter your Bruxie Access Code exactly as it appears below.  You will not need to use this code after youve completed the sign-up process. If you do not sign up before the expiration date, you must request a new code. · [employer] Access Code: [email or web address] Expires: 8/8/2018  3:13 PM 
 
4. Enter the last four digits of your Social Security Number (xxxx) and Date of Birth (mm/dd/yyyy) as indicated and click Submit. You will be taken to the next sign-up page. 5. Create a [employer] ID. This will be your [employer] login ID and cannot be changed, so think of one that is secure and easy to remember. 6. Create a [employer] password. You can change your password at any time. 7. Enter your Password Reset Question and Answer. This can be used at a later time if you forget your password. 8. Enter your e-mail address. You will receive e-mail notification when new information is available in [address]. 9. Click Sign Up. You can now view and download portions of your medical record. 10. Click the Download Summary menu link to download a portable copy of your medical information. If you have questions, please visit the Frequently Asked Questions section of the [employer] website. Remember, [employer] is NOT to be used for urgent needs. For medical emergencies, dial 911. Now available from your iPhone and Android! Please provide this summary of care documentation to your next provider. Your primary care clinician is listed as Gunjan Yung. If you have any questions after today's visit, please call [phone].

## 2018-05-11 ENCOUNTER — HOSPITAL ENCOUNTER (OUTPATIENT)
Dept: LAB | Age: 33
Discharge: HOME | End: 2018-05-11

## 2018-05-11 PROCEDURE — 99001 SPECIMEN HANDLING PT-LAB: CPT | Performed by: NURSE PRACTITIONER

## 2018-05-12 LAB
BASOPHILS # BLD AUTO: 0 X10E3/UL (ref 0–0.2)
BASOPHILS NFR BLD AUTO: 0 %
EOSINOPHIL # BLD AUTO: 0.3 X10E3/UL (ref 0–0.4)
EOSINOPHIL NFR BLD AUTO: 4 %
ERYTHROCYTE [DISTWIDTH] IN BLOOD BY AUTOMATED COUNT: 18.4 % (ref 12.3–15.4)
FERRITIN SERPL-MCNC: 16 NG/ML (ref 15–150)
FOLATE SERPL-MCNC: 11.4 NG/ML
HCT VFR BLD AUTO: 29.2 % (ref 34–46.6)
HGB BLD-MCNC: 8.7 G/DL (ref 11.1–15.9)
IMM GRANULOCYTES # BLD: 0 X10E3/UL (ref 0–0.1)
IMM GRANULOCYTES NFR BLD: 1 %
IRON SATN MFR SERPL: 83 % (ref 15–55)
IRON SERPL-MCNC: 351 UG/DL (ref 27–159)
LYMPHOCYTES # BLD AUTO: 2.4 X10E3/UL (ref 0.7–3.1)
LYMPHOCYTES NFR BLD AUTO: 37 %
MCH RBC QN AUTO: 20.3 PG (ref 26.6–33)
MCHC RBC AUTO-ENTMCNC: 29.8 G/DL (ref 31.5–35.7)
MCV RBC AUTO: 68 FL (ref 79–97)
MONOCYTES # BLD AUTO: 0.6 X10E3/UL (ref 0.1–0.9)
MONOCYTES NFR BLD AUTO: 10 %
NEUTROPHILS # BLD AUTO: 3.1 X10E3/UL (ref 1.4–7)
NEUTROPHILS NFR BLD AUTO: 48 %
PLATELET # BLD AUTO: 311 X10E3/UL (ref 150–379)
RBC # BLD AUTO: 4.29 X10E6/UL (ref 3.77–5.28)
TIBC SERPL-MCNC: 421 UG/DL (ref 250–450)
UIBC SERPL-MCNC: 70 UG/DL (ref 131–425)
VIT B12 SERPL-MCNC: 359 PG/ML (ref 232–1245)
WBC # BLD AUTO: 6.5 X10E3/UL (ref 3.4–10.8)

## 2018-05-15 ENCOUNTER — TELEPHONE (OUTPATIENT)
Dept: FAMILY MEDICINE CLINIC | Age: 33
End: 2018-05-15

## 2018-05-15 NOTE — PROGRESS NOTES
Call to patient to discuss labs. Message left requesting a rerun call. Express importance of return call.  Denice AGUILERA

## 2018-05-15 NOTE — TELEPHONE ENCOUNTER
Call to patient. Message left requesting a return call with no other information given.  Daniela Ross

## 2018-05-16 ENCOUNTER — TELEPHONE (OUTPATIENT)
Dept: FAMILY MEDICINE CLINIC | Age: 33
End: 2018-05-16

## 2018-05-16 NOTE — TELEPHONE ENCOUNTER
Patient has been called and patient was unavailable. Voicemail left for patient asking that she call the office to discuss her abnormal lab results. Office phone number was left on patient voicemail. Will attempt to contact patient later on today.

## 2018-05-17 ENCOUNTER — OFFICE VISIT (OUTPATIENT)
Dept: FAMILY MEDICINE CLINIC | Age: 33
End: 2018-05-17

## 2018-05-17 VITALS
WEIGHT: 201 LBS | SYSTOLIC BLOOD PRESSURE: 121 MMHG | RESPIRATION RATE: 16 BRPM | OXYGEN SATURATION: 96 % | TEMPERATURE: 97.9 F | DIASTOLIC BLOOD PRESSURE: 82 MMHG | BODY MASS INDEX: 35.61 KG/M2 | HEART RATE: 72 BPM | HEIGHT: 63 IN

## 2018-05-17 DIAGNOSIS — E83.19 IRON EXCESS: ICD-10-CM

## 2018-05-17 DIAGNOSIS — Z71.2 ENCOUNTER TO DISCUSS TEST RESULTS: ICD-10-CM

## 2018-05-17 DIAGNOSIS — D64.9 ANEMIA, UNSPECIFIED TYPE: ICD-10-CM

## 2018-05-17 DIAGNOSIS — D64.9 ANEMIA, UNSPECIFIED TYPE: Primary | ICD-10-CM

## 2018-05-17 LAB
HEMOCCULT STL QL: NEGATIVE
HGB BLD-MCNC: 8.9 G/DL
VALID INTERNAL CONTROL?: YES

## 2018-05-17 NOTE — PROGRESS NOTES
Chief Complaint   Patient presents with    Follow-up     follow up for labs       1. Have you been to the ER, urgent care clinic since your last visit? Hospitalized since your last visit? No    2. Have you seen or consulted any other health care providers outside of the [address] [place] [address] since your last visit? Include any pap smears or colon screening.  No     Health Maintenance Due   Topic Date Due    DTaP/Tdap/Td series (1 - Tdap) 01/06/2006    PAP AKA CERVICAL CYTOLOGY  01/06/2006

## 2018-05-17 NOTE — MR AVS SNAPSHOT
[facility] 
 
 
 [address] [address] [address] [address] [phone] Patient: Sam Ramirez MRN: [id number] [id number] Visit Information Date & Time Provider Department Dept. Phone Encounter #  
 5/17/2018  1:00 PM Dawson Fine NP [address] [phone] [id number] Upcoming Health Maintenance Date Due DTaP/Tdap/Td series (1 - Tdap) 1/6/2006 PAP AKA CERVICAL CYTOLOGY 1/6/2006 Influenza Age 5 to Adult 8/1/2018 Allergies as of 5/17/2018  Review Complete On: 5/17/2018 By: Dawson Fine NP Severity Noted Reaction Type Reactions Macrobid [Nitrofurantoin Monohyd/m-cryst]  08/17/2017    Nausea and Vomiting Morphine  08/17/2017    Itching Vicodin [Hydrocodone-acetaminophen]  08/17/2017    Nausea and Vomiting, Other (comments) Current Immunizations  Never Reviewed No immunizations on file. Not reviewed this visit You Were Diagnosed With   
  
 Codes Comments Anemia, unspecified type    -  Primary ICD-10-CM: D64.9 ICD-9-CM: 507. 9 Vitals BP Pulse Temp Resp Height(growth percentile) Weight(growth percentile) 121/82 (BP 1 Location: Right arm, BP Patient Position: Sitting) 72 97.9 °F (36.6 °C) (Oral) 16 5' 3\" (1.6 m) 201 lb (91.2 kg) LMP SpO2 BMI OB Status Smoking Status 05/05/2018 (Exact Date) 96% 35.61 kg/m2 Unknown Never Smoker BMI and BSA Data Body Mass Index Body Surface Area  
 35.61 kg/m 2 2.01 m 2 Preferred Pharmacy Pharmacy Name Phone [phone] - [address], [address] [facility] [address] [phone] Your Updated Medication List  
  
   
This list is accurate as of 5/17/18  1:52 PM.  Always use your most recent med list.  
  
  
  
  
 docusate sodium 100 mg capsule Commonly known as:  Araceli Parsley Take 1 Cap by mouth two (2) times a day for 90 days. ergocalciferol 50,000 unit capsule Commonly known as:  ERGOCALCIFEROL Take 1 Cap by mouth every seven (7) days. ferrous sulfate 325 mg (65 mg iron) EC tablet Commonly known as:  IRON Take 1 Tab by mouth three (3) times daily (with meals). multivitamin tablet Commonly known as:  ONE A DAY Take 1 Tab by mouth daily. We Performed the Following AMB POC FECAL BLOOD, OCCULT, QL 1 CARD [15076 CPT(R)] AMB POC HEMOGLOBIN (HGB) [16254 CPT(R)] REFERRAL TO HEMATOLOGY [UDY89 Custom] Comments:  
 Please evaluate and treat patient for anemia. To-Do List   
 05/17/2018 Lab:  CBC WITH AUTOMATED DIFF   
  
 05/17/2018 Lab:  IRON PROFILE   
  
 05/17/2018 Lab:  OCCULT BLOOD, IMMUNOASSAY (FIT) Around 08/15/2018 Lab:  METABOLIC PANEL, COMPREHENSIVE Referral Information Referral ID Referred By Referred To  
  
 [id number] Cristo Hayes MD   
   [address], [facility] [address] Phone: [phone] Fax: [phone] Visits Status Start Date End Date 1 New Request 5/17/18 5/17/19 If your referral has a status of pending review or denied, additional information will be sent to support the outcome of this decision. Patient Instructions Please contact our office if you have any questions about your visit today. Anemia: Care Instructions Your Care Instructions Anemia is a low level of red blood cells, which carry oxygen throughout your body. Many things can cause anemia. Lack of iron is one of the most common causes. Your body needs iron to make hemoglobin, a substance in red blood cells that carries oxygen from the lungs to your body's cells. Without enough iron, the body produces fewer and smaller red blood cells. As a result, your body's cells do not get enough oxygen, and you feel tired and weak. And you may have trouble concentrating. Bleeding is the most common cause of a lack of iron. You may have heavy menstrual bleeding or bleeding caused by conditions such as ulcers, hemorrhoids, or cancer. Regular use of aspirin or other anti-inflammatory medicines (such as ibuprofen) also can cause bleeding in some people. A lack of iron in your diet also can cause anemia, especially at times when the body needs more iron, such as during pregnancy, infancy, and the teen years. Your doctor may have prescribed iron pills. It may take several months of treatment for your iron levels to return to normal. Your doctor also may suggest that you eat foods that are rich in iron, such as meat and beans. There are many other causes of anemia. It is not always due to a lack of iron. Finding the specific cause of your anemia will help your doctor find the right treatment for you. Follow-up care is a key part of your treatment and safety. Be sure to make and go to all appointments, and call your doctor if you are having problems. It's also a good idea to know your test results and keep a list of the medicines you take. How can you care for yourself at home? · Take your medicines exactly as prescribed. Call your doctor if you think you are having a problem with your medicine. · If your doctor recommends iron pills, take them as directed: ¨ Try to take the pills on an empty stomach about 1 hour before or 2 hours after meals. But you may need to take iron with food to avoid an upset stomach. ¨ Do not take antacids or drink milk or caffeine drinks (such as coffee, tea, or cola) at the same time or within 2 hours of the time that you take your iron. They can make it hard for your body to absorb the iron. ¨ Vitamin C (from food or supplements) helps your body absorb iron. Try taking iron pills with a glass of orange juice or some other food that is high in vitamin C, such as citrus fruits.  
¨ Iron pills may cause stomach problems, such as heartburn, nausea, diarrhea, constipation, and cramps. Be sure to drink plenty of fluids, and include fruits, vegetables, and fiber in your diet each day. Iron pills often make your bowel movements dark or green. ¨ If you forget to take an iron pill, do not take a double dose of iron the next time you take a pill. ¨ Keep iron pills out of the reach of small children. An overdose of iron can be very dangerous. · Follow your doctor's advice about eating iron-rich foods. These include red meat, shellfish, poultry, eggs, beans, raisins, whole-grain bread, and leafy green vegetables. · Steam vegetables to help them keep their iron content. When should you call for help? Call 911 anytime you think you may need emergency care. For example, call if: 
? · You have symptoms of a heart attack. These may include: ¨ Chest pain or pressure, or a strange feeling in the chest. 
¨ Sweating. ¨ Shortness of breath. ¨ Nausea or vomiting. ¨ Pain, pressure, or a strange feeling in the back, neck, jaw, or upper belly or in one or both shoulders or arms. ¨ Lightheadedness or sudden weakness. ¨ A fast or irregular heartbeat. After you call 911, the  may tell you to chew 1 adult-strength or 2 to 4 low-dose aspirin. Wait for an ambulance. Do not try to drive yourself. ? · You passed out (lost consciousness). ?Call your doctor now or seek immediate medical care if: 
? · You have new or increased shortness of breath. ? · You are dizzy or lightheaded, or you feel like you may faint. ? · Your fatigue and weakness continue or get worse. ? · You have any abnormal bleeding, such as: 
¨ Nosebleeds. ¨ Vaginal bleeding that is different (heavier, more frequent, at a different time of the month) than what you are used to. ¨ Bloody or black stools, or rectal bleeding. ¨ Bloody or pink urine. ? Watch closely for changes in your health, and be sure to contact your doctor if: 
? · You do not get better as expected. Where can you learn more? Go to http://viraj-devora.info/. Enter R301 in the search box to learn more about \"Anemia: Care Instructions. \" Current as of: October 13, 2016 Content Version: 11.4 © 2938-0510 [employer]. Care instructions adapted under license by [employer] (which disclaims liability or warranty for this information). If you have questions about a medical condition or this instruction, always ask your healthcare professional. [address] any warranty or liability for your use of this information. Anemia From Heavy Bleeding: Care Instructions Your Care Instructions Anemia means that your body does not have enough red blood cells. Red blood cells carry oxygen around the body. When you have anemia, you may feel dizzy, tired, and weak. You may also feel your heart pounding. For some people, it's hard to focus and think clearly. One common cause of anemia is bleeding. Bleeding from ulcers, hemorrhoids, cancer, or other problems can cause anemia. It may also be caused by heavy menstrual periods. Your treatment may include iron pills. Iron helps your body make hemoglobin. Hemoglobin is the part of the red blood cell that carries oxygen. If you have severe anemia, you may need a blood transfusion to give you red blood cells as quickly as possible. Sometimes it takes several months to get iron levels back to normal. 
Follow-up care is a key part of your treatment and safety. Be sure to make and go to all appointments, and call your doctor if you are having problems. It's also a good idea to know your test results and keep a list of the medicines you take. How can you care for yourself at home? · Be safe with medicines. Take your medicines exactly as prescribed. Call your doctor if you think you are having a problem with your medicine.  
· Follow your doctor's advice about eating foods that have a lot of iron in them. These include red meat, shellfish, poultry, and eggs. They also include beans, raisins, whole-grain bread, and leafy green vegetables. · Steam your vegetables. This is the best way to prepare them if you want to get as much iron as possible. · Iron pills can cause constipation. If you take them, there are things you can do to avoid constipation. Drink plenty of fluids, eat foods with a lot of fiber, and exercise every day. When should you call for help? Call 911 anytime you think you may need emergency care. For example, call if: 
? · You passed out (lost consciousness). ? · Your stools are maroon or very bloody. ?Call your doctor now or seek immediate medical care if: 
? · You are short of breath. ? · You have new or worse bleeding. ? · You are dizzy or light-headed, or you feel like you may faint. ? Watch closely for changes in your health, and be sure to contact your doctor if: 
? · You feel weaker or more tired than usual.  
? · You do not get better as expected. Where can you learn more? Go to http://viraj-devora.info/. Enter C184 in the search box to learn more about \"Anemia From Heavy Bleeding: Care Instructions. \" Current as of: October 13, 2016 Content Version: 11.4 © 2235-6491 [employer]. Care instructions adapted under license by [employer] (which disclaims liability or warranty for this information). If you have questions about a medical condition or this instruction, always ask your healthcare professional. [place] [address] any warranty or liability for your use of this information. Introducing [facility]! Chino Aguilar introduces MeBeam patient portal. Now you can access parts of your medical record, email your doctor's office, and request medication refills online. 1. In your internet browser, go to [email or web address] 2. Click on the First Time User? Click Here link in the Sign In box. You will see the New Member Sign Up page. 3. Enter your Manyeta Access Code exactly as it appears below. You will not need to use this code after youve completed the sign-up process. If you do not sign up before the expiration date, you must request a new code. · Manyeta Access Code: [email or web address] Expires: 8/8/2018  3:13 PM 
 
4. Enter the last four digits of your Social Security Number (xxxx) and Date of Birth (mm/dd/yyyy) as indicated and click Submit. You will be taken to the next sign-up page. 5. Create a Manyeta ID. This will be your Manyeta login ID and cannot be changed, so think of one that is secure and easy to remember. 6. Create a Manyeta password. You can change your password at any time. 7. Enter your Password Reset Question and Answer. This can be used at a later time if you forget your password. 8. Enter your e-mail address. You will receive e-mail notification when new information is available in [address] 9. Click Sign Up. You can now view and download portions of your medical record. 10. Click the Download Summary menu link to download a portable copy of your medical information. If you have questions, please visit the Frequently Asked Questions section of the Manyeta website. Remember, Manyeta is NOT to be used for urgent needs. For medical emergencies, dial 911. Now available from your iPhone and Android! Please provide this summary of care documentation to your next provider. Your primary care clinician is listed as Ketan Alexander. If you have any questions after today's visit, please call [phone].

## 2018-05-17 NOTE — PATIENT INSTRUCTIONS
Please contact our office if you have any questions about your visit today. Anemia: Care Instructions  Your Care Instructions    Anemia is a low level of red blood cells, which carry oxygen throughout your body. Many things can cause anemia. Lack of iron is one of the most common causes. Your body needs iron to make hemoglobin, a substance in red blood cells that carries oxygen from the lungs to your body's cells. Without enough iron, the body produces fewer and smaller red blood cells. As a result, your body's cells do not get enough oxygen, and you feel tired and weak. And you may have trouble concentrating. Bleeding is the most common cause of a lack of iron. You may have heavy menstrual bleeding or bleeding caused by conditions such as ulcers, hemorrhoids, or cancer. Regular use of aspirin or other anti-inflammatory medicines (such as ibuprofen) also can cause bleeding in some people. A lack of iron in your diet also can cause anemia, especially at times when the body needs more iron, such as during pregnancy, infancy, and the teen years. Your doctor may have prescribed iron pills. It may take several months of treatment for your iron levels to return to normal. Your doctor also may suggest that you eat foods that are rich in iron, such as meat and beans. There are many other causes of anemia. It is not always due to a lack of iron. Finding the specific cause of your anemia will help your doctor find the right treatment for you. Follow-up care is a key part of your treatment and safety. Be sure to make and go to all appointments, and call your doctor if you are having problems. It's also a good idea to know your test results and keep a list of the medicines you take. How can you care for yourself at home? · Take your medicines exactly as prescribed. Call your doctor if you think you are having a problem with your medicine.   · If your doctor recommends iron pills, take them as directed:  ¨ Try to take the pills on an empty stomach about 1 hour before or 2 hours after meals. But you may need to take iron with food to avoid an upset stomach. ¨ Do not take antacids or drink milk or caffeine drinks (such as coffee, tea, or cola) at the same time or within 2 hours of the time that you take your iron. They can make it hard for your body to absorb the iron. ¨ Vitamin C (from food or supplements) helps your body absorb iron. Try taking iron pills with a glass of orange juice or some other food that is high in vitamin C, such as citrus fruits. ¨ Iron pills may cause stomach problems, such as heartburn, nausea, diarrhea, constipation, and cramps. Be sure to drink plenty of fluids, and include fruits, vegetables, and fiber in your diet each day. Iron pills often make your bowel movements dark or green. ¨ If you forget to take an iron pill, do not take a double dose of iron the next time you take a pill. ¨ Keep iron pills out of the reach of small children. An overdose of iron can be very dangerous. · Follow your doctor's advice about eating iron-rich foods. These include red meat, shellfish, poultry, eggs, beans, raisins, whole-grain bread, and leafy green vegetables. · Steam vegetables to help them keep their iron content. When should you call for help? Call 911 anytime you think you may need emergency care. For example, call if:  ? · You have symptoms of a heart attack. These may include:  ¨ Chest pain or pressure, or a strange feeling in the chest.  ¨ Sweating. ¨ Shortness of breath. ¨ Nausea or vomiting. ¨ Pain, pressure, or a strange feeling in the back, neck, jaw, or upper belly or in one or both shoulders or arms. ¨ Lightheadedness or sudden weakness. ¨ A fast or irregular heartbeat. After you call 911, the  may tell you to chew 1 adult-strength or 2 to 4 low-dose aspirin. Wait for an ambulance. Do not try to drive yourself. ? · You passed out (lost consciousness).    ?Call your doctor now or seek immediate medical care if:  ? · You have new or increased shortness of breath. ? · You are dizzy or lightheaded, or you feel like you may faint. ? · Your fatigue and weakness continue or get worse. ? · You have any abnormal bleeding, such as:  ¨ Nosebleeds. ¨ Vaginal bleeding that is different (heavier, more frequent, at a different time of the month) than what you are used to. ¨ Bloody or black stools, or rectal bleeding. ¨ Bloody or pink urine. ? Watch closely for changes in your health, and be sure to contact your doctor if:  ? · You do not get better as expected. Where can you learn more? Go to http://viraj-devora.info/. Enter R301 in the search box to learn more about \"Anemia: Care Instructions. \"  Current as of: October 13, 2016  Content Version: 11.4  © 8861-9939 [employer]. Care instructions adapted under license by [employer] (which disclaims liability or warranty for this information). If you have questions about a medical condition or this instruction, always ask your healthcare professional. [address] any warranty or liability for your use of this information. Anemia From Heavy Bleeding: Care Instructions  Your Care Instructions    Anemia means that your body does not have enough red blood cells. Red blood cells carry oxygen around the body. When you have anemia, you may feel dizzy, tired, and weak. You may also feel your heart pounding. For some people, it's hard to focus and think clearly. One common cause of anemia is bleeding. Bleeding from ulcers, hemorrhoids, cancer, or other problems can cause anemia. It may also be caused by heavy menstrual periods. Your treatment may include iron pills. Iron helps your body make hemoglobin. Hemoglobin is the part of the red blood cell that carries oxygen.  If you have severe anemia, you may need a blood transfusion to give you red blood cells as quickly as possible. Sometimes it takes several months to get iron levels back to normal.  Follow-up care is a key part of your treatment and safety. Be sure to make and go to all appointments, and call your doctor if you are having problems. It's also a good idea to know your test results and keep a list of the medicines you take. How can you care for yourself at home? · Be safe with medicines. Take your medicines exactly as prescribed. Call your doctor if you think you are having a problem with your medicine. · Follow your doctor's advice about eating foods that have a lot of iron in them. These include red meat, shellfish, poultry, and eggs. They also include beans, raisins, whole-grain bread, and leafy green vegetables. · Steam your vegetables. This is the best way to prepare them if you want to get as much iron as possible. · Iron pills can cause constipation. If you take them, there are things you can do to avoid constipation. Drink plenty of fluids, eat foods with a lot of fiber, and exercise every day. When should you call for help? Call 911 anytime you think you may need emergency care. For example, call if:  ? · You passed out (lost consciousness). ? · Your stools are maroon or very bloody. ?Call your doctor now or seek immediate medical care if:  ? · You are short of breath. ? · You have new or worse bleeding. ? · You are dizzy or light-headed, or you feel like you may faint. ? Watch closely for changes in your health, and be sure to contact your doctor if:  ? · You feel weaker or more tired than usual.   ? · You do not get better as expected. Where can you learn more? Go to http://viraj-devora.info/. Enter Y639 in the search box to learn more about \"Anemia From Heavy Bleeding: Care Instructions. \"  Current as of: October 13, 2016  Content Version: 11.4  © 8472-7383 Healthwise, Incorporated.  Care instructions adapted under license by [employer] (which disclaims liability or warranty for this information). If you have questions about a medical condition or this instruction, always ask your healthcare professional. [place] [address] any warranty or liability for your use of this information.

## 2018-05-17 NOTE — PROGRESS NOTES
HPI  Deandre Rodrigues is a 35 y.o. female  Chief Complaint   Patient presents with    Follow-up     follow up for labs     Reports she is feeling much better and states she has more energy. Reports she did have some mild shortness of breath earlier in the week but states she feels well overall. Denies chest pain or chest palpitations. Denies blood in her stool. Denies nausea, vomiting, abdominal pain, or diarrhea. Reports she did cut back on her iron to one tablet on Sunday as her period stopped on Saturday. Denies alcohol intake. Requesting lab results. Past Medical History  Past Medical History:   Diagnosis Date    Anemia     GERD (gastroesophageal reflux disease)     History of blood product transfusion        Surgical History  Past Surgical History:   Procedure Laterality Date    HX CARPAL TUNNEL RELEASE      bilat    HX MYOMECTOMY      HX RIGHT SALPINGO-OOPHORECTOMY          Medications  Current Outpatient Prescriptions   Medication Sig Dispense Refill    multivitamin (ONE A DAY) tablet Take 1 Tab by mouth daily.  ergocalciferol (ERGOCALCIFEROL) 50,000 unit capsule Take 1 Cap by mouth every seven (7) days. 12 Cap 6    docusate sodium (COLACE) 100 mg capsule Take 1 Cap by mouth two (2) times a day for 90 days.  60 Cap 2       Allergies  Allergies   Allergen Reactions    Macrobid [Nitrofurantoin Monohyd/M-Cryst] Nausea and Vomiting    Morphine Itching    Vicodin [Hydrocodone-Acetaminophen] Nausea and Vomiting and Other (comments)       Family History  Family History   Problem Relation Age of Onset    Hypertension Father     Diabetes Father     Diabetes Maternal Grandmother     Hypertension Maternal Grandmother     Arthritis-rheumatoid Maternal Grandfather     Diabetes Paternal Grandmother     Hypertension Paternal Grandmother     Arthritis-rheumatoid Paternal Grandfather        Social History  Social History     Social History    Marital status:      Spouse name: N/A   Catrachita Dumont Number of children: N/A    Years of education: N/A     Occupational History    Not on file. Social History Main Topics    Smoking status: Never Smoker    Smokeless tobacco: Never Used    Alcohol use Yes      Comment: socially    Drug use: No    Sexual activity: Yes     Other Topics Concern    Not on file     Social History Narrative       Problem List  Patient Active Problem List   Diagnosis Code    Gastroesophageal reflux disease K21.9    Obesity E66.9    Microcytic anemia D50.9    Preventive measure Z29.9       Review of Systems  Review of Systems   Constitutional: Negative for malaise/fatigue. Respiratory: Positive for shortness of breath. Cardiovascular: Negative for chest pain and palpitations. Gastrointestinal: Negative for blood in stool, nausea and vomiting. Genitourinary: Negative for hematuria. Neurological: Negative for weakness. Endo/Heme/Allergies: Negative for environmental allergies. Psychiatric/Behavioral: Negative for substance abuse. Vital Signs  Vitals:    05/17/18 1310   BP: 121/82   Pulse: 72   Resp: 16   Temp: 97.9 °F (36.6 °C)   TempSrc: Oral   SpO2: 96%   Weight: 201 lb (91.2 kg)   Height: 5' 3\" (1.6 m)   PainSc:   0 - No pain   LMP: 05/05/2018       Physical Exam  Physical Exam   Constitutional: She is oriented to person, place, and time. HENT:   Nose: Nose normal.   Mouth/Throat: Oropharynx is clear and moist.   Eyes: Conjunctivae and EOM are normal. Pupils are equal, round, and reactive to light. Cardiovascular: Normal rate, regular rhythm, normal heart sounds and intact distal pulses. Exam reveals no friction rub. No murmur heard. Pulmonary/Chest: Effort normal and breath sounds normal. No respiratory distress. Abdominal: Soft. Bowel sounds are normal. She exhibits no distension. There is no tenderness. There is no guarding. Genitourinary: Rectal exam shows no external hemorrhoid and guaiac negative stool.    Neurological: She is alert and oriented to person, place, and time. No cranial nerve deficit. Coordination normal.   Psychiatric: She has a normal mood and affect. Her behavior is normal.       Diagnostics  Orders Placed This Encounter    OCCULT BLOOD, IMMUNOASSAY (FIT)     Standing Status:   Future     Standing Expiration Date:   0/68/4843    METABOLIC PANEL, COMPREHENSIVE     Standing Status:   Future     Standing Expiration Date:   11/14/2018    CBC WITH AUTOMATED DIFF     Standing Status:   Future     Standing Expiration Date:   5/18/2019    IRON PROFILE     Standing Status:   Future     Standing Expiration Date:   5/18/2019    REFERRAL TO HEMATOLOGY     Referral Priority:   Routine     Referral Type:   Consultation     Referral Reason:   Specialty Services Required     Referred to Provider:   Janett Garcia MD    AMB POC HEMOGLOBIN (HGB)    AMB POC FECAL BLOOD, OCCULT, QL 1 CARD       Results  Results for orders placed or performed in visit on 05/17/18   AMB POC HEMOGLOBIN (HGB)   Result Value Ref Range    Hemoglobin (POC) 8.9    AMB POC FECAL BLOOD, OCCULT, QL 1 CARD   Result Value Ref Range    VALID INTERNAL CONTROL POC Yes     Hemoccult (POC) Negative Negative         Assessment and Plan  Diagnoses and all orders for this visit:    1. Anemia, unspecified type  -     AMB POC HEMOGLOBIN (HGB)  -     OCCULT BLOOD, IMMUNOASSAY (FIT); Future  -     METABOLIC PANEL, COMPREHENSIVE; Future  -     AMB POC FECAL BLOOD, OCCULT, QL 1 CARD  -     REFERRAL TO HEMATOLOGY  -     CBC WITH AUTOMATED DIFF; Future  -     IRON PROFILE; Future    2. Encounter to discuss test results    3. Iron excess  -     METABOLIC PANEL, COMPREHENSIVE; Future  -     REFERRAL TO HEMATOLOGY  -     CBC WITH AUTOMATED DIFF; Future  -     IRON PROFILE; Future    More than 50% of 30 minute visit spent counseling and coordinating care with patient face to face on anemia,  labs, iron overload, and importance of hematology and gyn follow up.    Discussed labs with patient in detail. Discussed the risk of iron overload including liver abnormalities and iron poisoning. Recommend that patient stop her iron pills. Recommended patient follow up with GYN. Discussed alarm symptoms and when she should seek emergency assistance. After care summary printed and reviewed with patient. Plan reviewed with patient. Questions answered. Patient verbalized understanding of plan and is in agreement with plan. Patient to follow up in one week or earlier if symptoms worsen.      Surya Del Real, FNP-C

## 2018-05-21 ENCOUNTER — HOSPITAL ENCOUNTER (OUTPATIENT)
Dept: LAB | Age: 33
Discharge: HOME | End: 2018-05-21
Payer: SELF-PAY

## 2018-05-21 ENCOUNTER — TELEPHONE (OUTPATIENT)
Dept: FAMILY MEDICINE CLINIC | Age: 33
End: 2018-05-21

## 2018-05-21 DIAGNOSIS — E83.19 IRON EXCESS: ICD-10-CM

## 2018-05-21 DIAGNOSIS — D64.9 ANEMIA, UNSPECIFIED TYPE: ICD-10-CM

## 2018-05-21 LAB
ALBUMIN SERPL-MCNC: 3.8 G/DL (ref 3.4–5)
ALBUMIN/GLOB SERPL: 1.1 {RATIO} (ref 0.8–1.7)
ALP SERPL-CCNC: 71 U/L (ref 45–117)
ALT SERPL-CCNC: 36 U/L (ref 13–56)
ANION GAP SERPL CALC-SCNC: 9 MMOL/L (ref 3–18)
AST SERPL-CCNC: 24 U/L (ref 15–37)
BASOPHILS # BLD: 0 K/UL (ref 0–0.06)
BASOPHILS NFR BLD: 0 % (ref 0–2)
BILIRUB SERPL-MCNC: 0.6 MG/DL (ref 0.2–1)
BUN SERPL-MCNC: 17 MG/DL (ref 7–18)
BUN/CREAT SERPL: 23 (ref 12–20)
CALCIUM SERPL-MCNC: 8.5 MG/DL (ref 8.5–10.1)
CHLORIDE SERPL-SCNC: 106 MMOL/L (ref 100–108)
CO2 SERPL-SCNC: 25 MMOL/L (ref 21–32)
CREAT SERPL-MCNC: 0.75 MG/DL (ref 0.6–1.3)
DIFFERENTIAL METHOD BLD: ABNORMAL
EOSINOPHIL # BLD: 0.2 K/UL (ref 0–0.4)
EOSINOPHIL NFR BLD: 3 % (ref 0–5)
ERYTHROCYTE [DISTWIDTH] IN BLOOD BY AUTOMATED COUNT: 25.5 % (ref 11.6–14.5)
GLOBULIN SER CALC-MCNC: 3.4 G/DL (ref 2–4)
GLUCOSE SERPL-MCNC: 86 MG/DL (ref 74–99)
HCT VFR BLD AUTO: 32.3 % (ref 35–45)
HGB BLD-MCNC: 10 G/DL (ref 12–16)
IRON SATN MFR SERPL: 7 %
IRON SERPL-MCNC: 32 UG/DL (ref 50–175)
LYMPHOCYTES # BLD: 1.5 K/UL (ref 0.9–3.6)
LYMPHOCYTES NFR BLD: 28 % (ref 21–52)
MCH RBC QN AUTO: 22.4 PG (ref 24–34)
MCHC RBC AUTO-ENTMCNC: 31 G/DL (ref 31–37)
MCV RBC AUTO: 72.3 FL (ref 74–97)
MONOCYTES # BLD: 0.6 K/UL (ref 0.05–1.2)
MONOCYTES NFR BLD: 11 % (ref 3–10)
NEUTS SEG # BLD: 3.1 K/UL (ref 1.8–8)
NEUTS SEG NFR BLD: 58 % (ref 40–73)
PLATELET # BLD AUTO: 250 K/UL (ref 135–420)
PLATELET COMMENTS,PCOM: ABNORMAL
PMV BLD AUTO: 9.8 FL (ref 9.2–11.8)
POTASSIUM SERPL-SCNC: 4 MMOL/L (ref 3.5–5.5)
PROT SERPL-MCNC: 7.2 G/DL (ref 6.4–8.2)
RBC # BLD AUTO: 4.47 M/UL (ref 4.2–5.3)
RBC MORPH BLD: ABNORMAL
SODIUM SERPL-SCNC: 140 MMOL/L (ref 136–145)
TIBC SERPL-MCNC: 436 UG/DL (ref 250–450)
WBC # BLD AUTO: 5.4 K/UL (ref 4.6–13.2)

## 2018-05-21 PROCEDURE — 36415 COLL VENOUS BLD VENIPUNCTURE: CPT | Performed by: NURSE PRACTITIONER

## 2018-05-21 PROCEDURE — 80053 COMPREHEN METABOLIC PANEL: CPT | Performed by: NURSE PRACTITIONER

## 2018-05-21 PROCEDURE — 83540 ASSAY OF IRON: CPT | Performed by: NURSE PRACTITIONER

## 2018-05-21 PROCEDURE — 85025 COMPLETE CBC W/AUTO DIFF WBC: CPT | Performed by: NURSE PRACTITIONER

## 2018-05-21 NOTE — TELEPHONE ENCOUNTER
Call to patient to discuss lab results. No answer. Message left requesting a return call with no information given.  Daniela Ross

## 2018-05-31 ENCOUNTER — TELEPHONE (OUTPATIENT)
Dept: FAMILY MEDICINE CLINIC | Age: 33
End: 2018-05-31

## 2018-05-31 NOTE — TELEPHONE ENCOUNTER
Patient came to office today to inquire about lab results. Patient was told that she would need to schedule an appointment to be seen in office to discuss lab results by front office staff.

## 2018-06-05 NOTE — TELEPHONE ENCOUNTER
Call to patient at [phone] to discuss her lab results. Message left requesting she call the office. No other information given.  Daniela Ross

## 2018-06-06 NOTE — TELEPHONE ENCOUNTER
Letter to be sent to the patient 6/6/2018. Provider Gee Tinoco has attempted to reach the patient by phone 3 times to discuss results.

## 2018-06-07 ENCOUNTER — TELEPHONE (OUTPATIENT)
Dept: FAMILY MEDICINE CLINIC | Age: 33
End: 2018-06-07

## 2018-06-13 NOTE — TELEPHONE ENCOUNTER
Letter was written and sent to the mail requesting that the patient call the office to discuss lab results with the provider.

## 2018-06-22 NOTE — PROGRESS NOTES
Several attempts have been made to speak with patient about lab results. Patient unavailable. Letter to be sent notifying patient of results.

## 2018-06-26 NOTE — TELEPHONE ENCOUNTER
Letter was sent out to the patient last week requesting patient call back to the office to speak with the provider or make a follow up appointment.

## 2018-07-16 ENCOUNTER — OFFICE VISIT (OUTPATIENT)
Dept: ONCOLOGY | Age: 33
End: 2018-07-16

## 2018-07-16 ENCOUNTER — HOSPITAL ENCOUNTER (OUTPATIENT)
Dept: ONCOLOGY | Age: 33
Discharge: HOME | End: 2018-07-16

## 2018-07-16 VITALS
TEMPERATURE: 98.6 F | BODY MASS INDEX: 35.26 KG/M2 | SYSTOLIC BLOOD PRESSURE: 125 MMHG | HEIGHT: 63 IN | HEART RATE: 70 BPM | WEIGHT: 199 LBS | DIASTOLIC BLOOD PRESSURE: 76 MMHG

## 2018-07-16 DIAGNOSIS — N92.1 MENORRHAGIA WITH IRREGULAR CYCLE: ICD-10-CM

## 2018-07-16 DIAGNOSIS — D50.0 IRON DEFICIENCY ANEMIA DUE TO CHRONIC BLOOD LOSS: ICD-10-CM

## 2018-07-16 DIAGNOSIS — D50.0 IRON DEFICIENCY ANEMIA DUE TO CHRONIC BLOOD LOSS: Primary | ICD-10-CM

## 2018-07-16 PROBLEM — N92.0 MENORRHAGIA: Status: ACTIVE | Noted: 2018-07-16

## 2018-07-16 LAB
BASO+EOS+MONOS # BLD AUTO: 0.5 K/UL (ref 0–2.3)
BASO+EOS+MONOS # BLD AUTO: 12 % (ref 0.1–17)
DIFFERENTIAL METHOD BLD: ABNORMAL
ERYTHROCYTE [DISTWIDTH] IN BLOOD BY AUTOMATED COUNT: 20.2 % (ref 11.5–14.5)
HCT VFR BLD AUTO: 31.5 % (ref 36–48)
HGB BLD-MCNC: 9.7 G/DL (ref 12–16)
LYMPHOCYTES # BLD: 1.5 K/UL (ref 1.1–5.9)
LYMPHOCYTES NFR BLD: 35 % (ref 14–44)
MCH RBC QN AUTO: 22.8 PG (ref 25–35)
MCHC RBC AUTO-ENTMCNC: 30.8 G/DL (ref 31–37)
MCV RBC AUTO: 73.9 FL (ref 78–102)
NEUTS SEG # BLD: 2.4 K/UL (ref 1.8–9.5)
NEUTS SEG NFR BLD: 54 % (ref 40–70)
PLATELET # BLD AUTO: 289 K/UL (ref 140–440)
RBC # BLD AUTO: 4.26 M/UL (ref 4.1–5.1)
WBC # BLD AUTO: 4.4 K/UL (ref 4.5–13)

## 2018-07-16 NOTE — PATIENT INSTRUCTIONS
Iron Deficiency Anemia: Care Instructions  Your Care Instructions    Anemia means that you do not have enough red blood cells. Red blood cells carry oxygen around your body. When you have anemia, it can make you pale, weak, and tired. Many things can cause anemia. The most common cause is loss of blood. This can happen if you have heavy menstrual periods. It can also happen if you have bleeding in your stomach or bowel. You can also get anemia if you don't have enough iron in your diet or if it's hard for your body to absorb iron. In some cases, pregnancy causes anemia. That's because a pregnant woman needs more iron. Your doctor may do more tests to find the cause of your anemia. If a disease or other health problem is causing it, your doctor will treat that problem. It's important to follow up with your doctor to make sure that your iron level returns to normal.  Follow-up care is a key part of your treatment and safety. Be sure to make and go to all appointments, and call your doctor if you are having problems. It's also a good idea to know your test results and keep a list of the medicines you take. How can you care for yourself at home? · If your doctor recommended iron pills, take them as directed. ¨ Try to take the pills on an empty stomach. You can do this about 1 hour before or 2 hours after meals. But you may need to take iron with food to avoid an upset stomach. ¨ Do not take antacids or drink milk or anything with caffeine within 2 hours of when you take your iron. They can keep your body from absorbing the iron well. ¨ Vitamin C helps your body absorb iron. You may want to take iron pills with a glass of orange juice or some other food high in vitamin C.  ¨ Iron pills may cause stomach problems. These include heartburn, nausea, diarrhea, constipation, and cramps. It can help to drink plenty of fluids and include fruits, vegetables, and fiber in your diet.   ¨ It's normal for iron pills to make your stool a greenish or grayish black. But internal bleeding can also cause dark stool. So it's important to tell your doctor about any color changes. ¨ Call your doctor if you think you are having a problem with your iron pills. Even after you start to feel better, it will take several months for your body to build up its supply of iron. ¨ If you miss a pill, don't take a double dose. ¨ Keep iron pills out of the reach of small children. Too much iron can be very dangerous. · Eat foods with a lot of iron. These include red meat, shellfish, poultry, and eggs. They also include beans, raisins, whole-grain bread, and leafy green vegetables. · Steam your vegetables. This is the best way to prepare them if you want to get as much iron as possible. · Be safe with medicines. Do not take nonsteroidal anti-inflammatory pain relievers unless your doctor tells you to. These include aspirin, naproxen (Aleve), and ibuprofen (Advil, Motrin). · Liquid iron can stain your teeth. But you can mix it with water or juice and drink it with a straw. Then it won't get on your teeth. When should you call for help? Call 911 anytime you think you may need emergency care. For example, call if:    · You passed out (lost consciousness).    Call your doctor now or seek immediate medical care if:    · You are short of breath.     · You are dizzy or light-headed, or you feel like you may faint.     · You have new or worse bleeding.    Watch closely for changes in your health, and be sure to contact your doctor if:    · You feel weaker or more tired than usual.     · You do not get better as expected. Where can you learn more? Go to http://viraj-devora.info/. Enter T789 in the search box to learn more about \"Iron Deficiency Anemia: Care Instructions. \"  Current as of: October 9, 2017  Content Version: 11.7  © 7296-6613 [employer].  Care instructions adapted under license by [employer] (which disclaims liability or warranty for this information). If you have questions about a medical condition or this instruction, always ask your healthcare professional. [address] any warranty or liability for your use of this information.

## 2018-07-16 NOTE — MR AVS SNAPSHOT
[address] [address] [address] [facility] 
[phone] Patient: Johanna Gutierrez MRN: [id number] TLE:[birth year] Visit Information Date & Time Provider Department Dept. Phone Encounter #  
 7/16/2018 10:00 AM Leeanna Jimenes MD [facility] Medical Oncology [phone] [id number] Follow-up Instructions Return in about 1 week (around 7/23/2018). Your Appointments 7/23/2018  3:45 PM  
Office Visit with Leeanna Jimenes MD  
[address] [facility]  Oncology [facility]) Appt Note: LAB RESULTS  
 [address] [facility], [address] [place] [address] 200 [facility] Upcoming Health Maintenance Date Due DTaP/Tdap/Td series (1 - Tdap) 1/6/2006 PAP AKA CERVICAL CYTOLOGY 1/6/2006 Influenza Age 5 to Adult 8/1/2018 Allergies as of 7/16/2018  Review Complete On: 7/16/2018 By: Leeanna Jimenes MD  
  
 Severity Noted Reaction Type Reactions Macrobid [Nitrofurantoin Monohyd/m-cryst]  08/17/2017    Nausea and Vomiting Morphine  08/17/2017    Itching Vicodin [Hydrocodone-acetaminophen]  08/17/2017    Nausea and Vomiting, Other (comments) Current Immunizations  Never Reviewed No immunizations on file. Not reviewed this visit You Were Diagnosed With   
  
 Codes Comments Iron deficiency anemia due to chronic blood loss    -  Primary ICD-10-CM: D50.0 ICD-9-CM: 280.0 Menorrhagia with irregular cycle     ICD-10-CM: N92.1 ICD-9-CM: 626.2 Vitals OB Status Smoking Status Unknown Never Smoker Preferred Pharmacy Pharmacy Name Phone [phone] - [place], [place] [facility] [address] AT [address] [phone] Your Updated Medication List  
  
   
 This list is accurate as of 7/16/18 11:15 AM.  Always use your most recent med list.  
  
  
  
  
 docusate sodium 100 mg capsule Commonly known as:  Gevena Living Take 1 Cap by mouth two (2) times a day for 90 days. ergocalciferol 50,000 unit capsule Commonly known as:  ERGOCALCIFEROL Take 1 Cap by mouth every seven (7) days. multivitamin tablet Commonly known as:  ONE A DAY Take 1 Tab by mouth daily. We Performed the Following COMPLETE CBC & AUTO DIFF WBC [68277 CPT(R)] FERRITIN [83192 CPT(R)] HEMOGLOBIN FRACTIONATION [[id number] Custom] IRON PROFILE [id number] CPT(R)] METABOLIC PANEL, COMPREHENSIVE [29675 CPT(R)] RETICULOCYTE COUNT [id number] CPT(R)] Follow-up Instructions Return in about 1 week (around 7/23/2018). To-Do List   
 07/16/2018 Lab:  CBC WITH 3 PART DIFF   
  
 07/16/2018 Lab:  FERRITIN   
  
 07/16/2018 Lab:  HEMOGLOBIN FRACTIONATION   
  
 07/16/2018 Lab:  IRON PROFILE   
  
 07/16/2018 Lab:  METABOLIC PANEL, COMPREHENSIVE   
  
 07/16/2018 Lab:  RETICULOCYTE COUNT Patient Instructions Iron Deficiency Anemia: Care Instructions Your Care Instructions Anemia means that you do not have enough red blood cells. Red blood cells carry oxygen around your body. When you have anemia, it can make you pale, weak, and tired. Many things can cause anemia. The most common cause is loss of blood. This can happen if you have heavy menstrual periods. It can also happen if you have bleeding in your stomach or bowel. You can also get anemia if you don't have enough iron in your diet or if it's hard for your body to absorb iron. In some cases, pregnancy causes anemia. That's because a pregnant woman needs more iron. Your doctor may do more tests to find the cause of your anemia. If a disease or other health problem is causing it, your doctor will treat that problem. It's important to follow up with your doctor to make sure that your iron level returns to normal. 
Follow-up care is a key part of your treatment and safety. Be sure to make and go to all appointments, and call your doctor if you are having problems. It's also a good idea to know your test results and keep a list of the medicines you take. How can you care for yourself at home? · If your doctor recommended iron pills, take them as directed. ¨ Try to take the pills on an empty stomach. You can do this about 1 hour before or 2 hours after meals. But you may need to take iron with food to avoid an upset stomach. ¨ Do not take antacids or drink milk or anything with caffeine within 2 hours of when you take your iron. They can keep your body from absorbing the iron well. ¨ Vitamin C helps your body absorb iron. You may want to take iron pills with a glass of orange juice or some other food high in vitamin C. 
¨ Iron pills may cause stomach problems. These include heartburn, nausea, diarrhea, constipation, and cramps. It can help to drink plenty of fluids and include fruits, vegetables, and fiber in your diet. ¨ It's normal for iron pills to make your stool a greenish or grayish black. But internal bleeding can also cause dark stool. So it's important to tell your doctor about any color changes. ¨ Call your doctor if you think you are having a problem with your iron pills. Even after you start to feel better, it will take several months for your body to build up its supply of iron. ¨ If you miss a pill, don't take a double dose. ¨ Keep iron pills out of the reach of small children. Too much iron can be very dangerous. · Eat foods with a lot of iron. These include red meat, shellfish, poultry, and eggs. They also include beans, raisins, whole-grain bread, and leafy green vegetables. · Steam your vegetables. This is the best way to prepare them if you want to get as much iron as possible. · Be safe with medicines. Do not take nonsteroidal anti-inflammatory pain relievers unless your doctor tells you to. These include aspirin, naproxen (Aleve), and ibuprofen (Advil, Motrin). · Liquid iron can stain your teeth. But you can mix it with water or juice and drink it with a straw. Then it won't get on your teeth. When should you call for help? Call 911 anytime you think you may need emergency care. For example, call if: 
  · You passed out (lost consciousness).  
 Call your doctor now or seek immediate medical care if: 
  · You are short of breath.  
  · You are dizzy or light-headed, or you feel like you may faint.  
  · You have new or worse bleeding.  
 Watch closely for changes in your health, and be sure to contact your doctor if: 
  · You feel weaker or more tired than usual.  
  · You do not get better as expected. Where can you learn more? Go to http://viraj-devora.info/. Enter C561 in the search box to learn more about \"Iron Deficiency Anemia: Care Instructions. \" Current as of: October 9, 2017 Content Version: 11.7 © 5169-9382 [employer]. Care instructions adapted under license by [employer] (which disclaims liability or warranty for this information). If you have questions about a medical condition or this instruction, always ask your healthcare professional. [address] any warranty or liability for your use of this information. Introducing [facility]! Deabernardo Blakely Session: Thank you for requesting a Thoof account. Our records indicate that you already have an active Thoof account. You can access your account anytime at [email or web address] Did you know that you can access your hospital and ER discharge instructions at any time in Thoof? You can also review all of your test results from your hospital stay or ER visit. Additional Information If you have questions, please visit the Frequently Asked Questions section of the [employer] website at [email or web address] Remember, [employer]rate is NOT to be used for urgent needs. For medical emergencies, dial 911. Now available from your iPhone and Android! Please provide this summary of care documentation to your next provider. Your primary care clinician is listed as Shellie Sargent. If you have any questions after today's visit, please call [phone].

## 2018-07-16 NOTE — PROGRESS NOTES
Hematology/Oncology Consultation Note    Name: Eric Bolaños  Date: 2018  : 1985    PCP: [id number] Saad Iniguez DO       Ms. Bri Gilliam  is a 35 y.o. -American woman who was referred for evaluation of anemia. Subjective:   Chief complaint: Anemia    History of present illness:  Ms. Bri Gilliam is a 77-year-old woman who recently was found to have anemia. She reports that she does have a history of menorrhagia and recently in May of this year was found to have an anemia with hemoglobin of 10 g/dL and hematocrit of 32%. At that time she had an iron saturation of 7% and a circulating iron level 32 mcg/dL. On reviewing the records from 2018 she had a ferritin level of 16 ng/mL. Patient reports that she was started on iron initially but was told to discontinue the iron therapy. She is here today for complete assessment. Primarily she complains of a mild degree of fatigue but otherwise has no additional complaints or concerns to report. Past Medical History:   Diagnosis Date    Anemia     GERD (gastroesophageal reflux disease)     History of blood product transfusion        Allergies   Allergen Reactions    Macrobid [Nitrofurantoin Monohyd/M-Cryst] Nausea and Vomiting    Morphine Itching    Vicodin [Hydrocodone-Acetaminophen] Nausea and Vomiting and Other (comments)       Past Surgical History:   Procedure Laterality Date    HX CARPAL TUNNEL RELEASE      bilat    HX MYOMECTOMY      HX RIGHT SALPINGO-OOPHORECTOMY         Social History     Social History    Marital status:      Spouse name: N/A    Number of children: N/A    Years of education: N/A     Occupational History    Not on file.      Social History Main Topics    Smoking status: Never Smoker    Smokeless tobacco: Never Used    Alcohol use Yes      Comment: socially    Drug use: No    Sexual activity: Yes     Other Topics Concern    Not on file     Social History Narrative       Family History   Problem Relation Age of Onset    Hypertension Father     Diabetes Father     Diabetes Maternal Grandmother     Hypertension Maternal Grandmother     Arthritis-rheumatoid Maternal Grandfather     Diabetes Paternal Grandmother     Hypertension Paternal Grandmother     Arthritis-rheumatoid Paternal Grandfather     Thyroid Disease Sister     No Known Problems Brother        Current Outpatient Prescriptions   Medication Sig Dispense Refill    multivitamin (ONE A DAY) tablet Take 1 Tab by mouth daily.  docusate sodium (COLACE) 100 mg capsule Take 1 Cap by mouth two (2) times a day for 90 days. 60 Cap 2    ergocalciferol (ERGOCALCIFEROL) 50,000 unit capsule Take 1 Cap by mouth every seven (7) days. 12 Cap 6     Review of Systems    General ROS:The patient has complaints of a mild degree of fatigue. Psychological ROS: patient denies having any psychological symptoms such as hallucinations, depression or anxiety. Ophthalmic ROS:the patient denies having any visual impairment or eye discomfort. ENT ROS: there are no abnormalities reported. Allergy and Immunology ROS:the patient denies having any seasonal allergies or allergies to medications other than those already outlined above. Hematological and Lymphatic ROS: the patient denies having any bruising, bleeding or lymphadenopathy. Endocrine ROS: the patient denies having any heat or cold intolerance. There is no history of diabetes or thyroid disorders. Breast ROS: the patient denies having any history of breast mass, nipple discharge, or lumps. Respiratory ROS:the patient denies having any cough, shortness of breath, or dyspnea on exertion. Cardiovascular ROS: there are no complaints of chest pain, palpitations, chest pounding, or dyspnea on exertion. Gastrointestinal ROS: the patient denies having nausea, emesis, diarrhea, constipation, or blood in the stool. Genito-Urinary ROS: the patient denies having urinary urgency, frequency, or dysuria.   Musculoskeletal ROS: with the exception of mild arthralgias the patient has no other musculoskeletal complaints. Neurological ROS: the patient denies having any numbness, tingling, or neurologic deficits. Dermatological ROS:patient denies having any unexplained rash, skin ulcerations, or hives. Objective:     Visit Vitals    /76    Pulse 70    Temp 98.6 °F (37 °C)    Ht 5' 3\" (1.6 m)    Wt 90.3 kg (199 lb)    BMI 35.25 kg/m2        ECOGPS=0  Physical Exam:   Gen. Appearance: the patient is in no acute distress. Skin: There is no evidence of bruise or rash. HEENT: The head is normocephalic and atraumatic. The conjunctiva and sclera are clear. Pupils are equal, round, reactive to light, and accommodation. The extraocular movements are intact. ENT reveals no oral mucosal lesions or ulcerations. Neck: Supple without lymphadenopathy or thyromegaly. Lungs: Clear to auscultation and percussion; there are no wheezes or rhonchi. Heart: Regular rate and rhythm; there are no murmurs, gallops, or rubs. Abdomen: Bowel sounds are present and normal.  There is no guarding, tenderness, or hepatosplenomegaly. Extremities: There is no clubbing, cyanosis, or edema. Neurologic: There are no focal neurologic deficits. Lymphatics: There is no palpable peripheral lymphadenopathy. Lab data:  From 5/21/2018 iron was 32 mcg/dL, TIBC 436 mcg/dL and iron saturation 7%. A ferritin level was obtained on 5/12/2018 and this was 16 mg lab data from 5/21/2018 showed WBC count 5.4, hemoglobin 10 g/dL, hematocrit 32.3%, MCV 72.3, and platelet count 94,817. The metabolic panel from 8/23/7513 showed a sodium 140, potassium 4, chloride 106, CO2 25, BUN 17, creatinine 0.75, calcium 8.5, total bilirubin 0.6, ALT 36, AST 24, and alkaline phosphatase 71. Assessment:   Chronic anemia: Have explained to the patient that she likely has, based on these data progressive iron deficiency anemia due to chronic blood loss.       Plan: Chronic anemia: I suspect that this is progressive iron deficiency anemia. She has been on oral iron supplementation. At this time I will order a ferritin level, comprehensive metabolic panel, and iron profile. Additionally erythropoietin and hemoglobin electrophoresis will also be ordered. Pending the results of this test the patient may need to be given iron in the form of either Injectafer or Venofer intravenously. Therefore I will have her return in 1 week to review lab data and to discuss the best option of management. Follow-up in 1 week    Orders Placed This Encounter    COMPLETE CBC & AUTO DIFF WBC    InHouse CBC (Instant AV)     Standing Status:   Future     Standing Expiration Date:   6/88/7108    METABOLIC PANEL, COMPREHENSIVE     Standing Status:   Future     Number of Occurrences:   1     Standing Expiration Date:   7/17/2019    IRON PROFILE     Standing Status:   Future     Number of Occurrences:   1     Standing Expiration Date:   7/17/2019    FERRITIN     Standing Status:   Future     Number of Occurrences:   1     Standing Expiration Date:   7/17/2019    HEMOGLOBIN FRACTIONATION     Standing Status:   Future     Number of Occurrences:   1     Standing Expiration Date:   7/17/2019    RETICULOCYTE COUNT     Standing Status:   Future     Number of Occurrences:   1     Standing Expiration Date:   7/17/2019           Lynne Briones MD  7/16/2018      Please note: This document has been produced using voice recognition software. Unrecognized errors in transcription may be present.

## 2018-07-21 LAB
ALBUMIN SERPL-MCNC: 4.2 G/DL (ref 3.5–5.5)
ALBUMIN/GLOB SERPL: 1.6 {RATIO} (ref 1.2–2.2)
ALP SERPL-CCNC: 62 IU/L (ref 39–117)
ALT SERPL-CCNC: 13 IU/L (ref 0–32)
AST SERPL-CCNC: 16 IU/L (ref 0–40)
BILIRUB SERPL-MCNC: 0.4 MG/DL (ref 0–1.2)
BUN SERPL-MCNC: 14 MG/DL (ref 6–20)
BUN/CREAT SERPL: 21 (ref 9–23)
CALCIUM SERPL-MCNC: 8.7 MG/DL (ref 8.7–10.2)
CHLORIDE SERPL-SCNC: 106 MMOL/L (ref 96–106)
CO2 SERPL-SCNC: 19 MMOL/L (ref 20–29)
CREAT SERPL-MCNC: 0.67 MG/DL (ref 0.57–1)
FERRITIN SERPL-MCNC: 11 NG/ML (ref 15–150)
GLOBULIN SER CALC-MCNC: 2.6 G/DL (ref 1.5–4.5)
GLUCOSE SERPL-MCNC: 87 MG/DL (ref 65–99)
HGB A MFR BLD: 98 % (ref 96.4–98.8)
HGB A2 MFR BLD COLUMN CHROM: 2 % (ref 1.8–3.2)
HGB C MFR BLD: 0 %
HGB F MFR BLD: 0 % (ref 0–2)
HGB FRACT BLD-IMP: NORMAL
HGB OTHER MFR BLD HPLC: 0 %
HGB S BLD QL SOLY: NEGATIVE
HGB S MFR BLD: 0 %
IRON SATN MFR SERPL: 4 % (ref 15–55)
IRON SERPL-MCNC: 18 UG/DL (ref 27–159)
POTASSIUM SERPL-SCNC: 4.4 MMOL/L (ref 3.5–5.2)
PROT SERPL-MCNC: 6.8 G/DL (ref 6–8.5)
RETICS/RBC NFR AUTO: 1.2 % (ref 0.6–2.6)
SODIUM SERPL-SCNC: 140 MMOL/L (ref 134–144)
TIBC SERPL-MCNC: 440 UG/DL (ref 250–450)
UIBC SERPL-MCNC: 422 UG/DL (ref 131–425)

## 2018-07-23 ENCOUNTER — OFFICE VISIT (OUTPATIENT)
Dept: ONCOLOGY | Age: 33
End: 2018-07-23

## 2018-07-23 VITALS
WEIGHT: 200.6 LBS | TEMPERATURE: 98.6 F | DIASTOLIC BLOOD PRESSURE: 78 MMHG | HEART RATE: 78 BPM | SYSTOLIC BLOOD PRESSURE: 125 MMHG | RESPIRATION RATE: 18 BRPM | BODY MASS INDEX: 35.53 KG/M2

## 2018-07-23 DIAGNOSIS — D50.0 IRON DEFICIENCY ANEMIA DUE TO CHRONIC BLOOD LOSS: Primary | ICD-10-CM

## 2018-07-23 DIAGNOSIS — N92.0 MENORRHAGIA WITH REGULAR CYCLE: ICD-10-CM

## 2018-07-23 NOTE — PATIENT INSTRUCTIONS
Iron Deficiency Anemia: Care Instructions  Your Care Instructions    Anemia means that you do not have enough red blood cells. Red blood cells carry oxygen around your body. When you have anemia, it can make you pale, weak, and tired. Many things can cause anemia. The most common cause is loss of blood. This can happen if you have heavy menstrual periods. It can also happen if you have bleeding in your stomach or bowel. You can also get anemia if you don't have enough iron in your diet or if it's hard for your body to absorb iron. In some cases, pregnancy causes anemia. That's because a pregnant woman needs more iron. Your doctor may do more tests to find the cause of your anemia. If a disease or other health problem is causing it, your doctor will treat that problem. It's important to follow up with your doctor to make sure that your iron level returns to normal.  Follow-up care is a key part of your treatment and safety. Be sure to make and go to all appointments, and call your doctor if you are having problems. It's also a good idea to know your test results and keep a list of the medicines you take. How can you care for yourself at home? · If your doctor recommended iron pills, take them as directed. ¨ Try to take the pills on an empty stomach. You can do this about 1 hour before or 2 hours after meals. But you may need to take iron with food to avoid an upset stomach. ¨ Do not take antacids or drink milk or anything with caffeine within 2 hours of when you take your iron. They can keep your body from absorbing the iron well. ¨ Vitamin C helps your body absorb iron. You may want to take iron pills with a glass of orange juice or some other food high in vitamin C.  ¨ Iron pills may cause stomach problems. These include heartburn, nausea, diarrhea, constipation, and cramps. It can help to drink plenty of fluids and include fruits, vegetables, and fiber in your diet.   ¨ It's normal for iron pills to make your stool a greenish or grayish black. But internal bleeding can also cause dark stool. So it's important to tell your doctor about any color changes. ¨ Call your doctor if you think you are having a problem with your iron pills. Even after you start to feel better, it will take several months for your body to build up its supply of iron. ¨ If you miss a pill, don't take a double dose. ¨ Keep iron pills out of the reach of small children. Too much iron can be very dangerous. · Eat foods with a lot of iron. These include red meat, shellfish, poultry, and eggs. They also include beans, raisins, whole-grain bread, and leafy green vegetables. · Steam your vegetables. This is the best way to prepare them if you want to get as much iron as possible. · Be safe with medicines. Do not take nonsteroidal anti-inflammatory pain relievers unless your doctor tells you to. These include aspirin, naproxen (Aleve), and ibuprofen (Advil, Motrin). · Liquid iron can stain your teeth. But you can mix it with water or juice and drink it with a straw. Then it won't get on your teeth. When should you call for help? Call 911 anytime you think you may need emergency care. For example, call if:    · You passed out (lost consciousness).    Call your doctor now or seek immediate medical care if:    · You are short of breath.     · You are dizzy or light-headed, or you feel like you may faint.     · You have new or worse bleeding.    Watch closely for changes in your health, and be sure to contact your doctor if:    · You feel weaker or more tired than usual.     · You do not get better as expected. Where can you learn more? Go to http://viraj-devora.info/. Enter X030 in the search box to learn more about \"Iron Deficiency Anemia: Care Instructions. \"  Current as of: October 9, 2017  Content Version: 11.7  © 3559-2342 [employer].  Care instructions adapted under license by [employer] (which disclaims liability or warranty for this information). If you have questions about a medical condition or this instruction, always ask your healthcare professional. [address] any warranty or liability for your use of this information.

## 2018-07-23 NOTE — PROGRESS NOTES
Hematology/medical oncology progress note    7/23/2018  Joseph Mcfadden  YOB: 1985    PCP: Dr. Shakir June    Diagnosis: Severe iron deficiency anemia    Ms. Marshall Enriquez is a 26-year-old woman who was referred for an evaluation of anemia. I have explained to the patient that her CBC on 7/16/2018 revealed a WBC count of 4.4, hemoglobin 9.7 g/dL, hematocrit 30.5%, and her platelet count was 524,439. A hemoglobin electrophoresis revealed normal adult hemoglobin. The iron profile revealed that she had an iron level of 18 mcg/dL with an iron saturation of only 4%. Her ferritin level was quite low at only 11 ng/mL. Based on these results she has severe iron deficiency anemia. Therefore, I have recommended intravenous iron replenishment therapy with Venofer at a dose of 250 mg every 2 weeks for 4 complete doses. I have reviewed the risk, benefits, and side effects of the Venofer infusion with the patient. She has expressed a willingness to comply with the recommendation. We will schedule medication to be given every 2 weeks beginning either later this week or early next week. The patient had her questions answered to her satisfaction. Total time 25 minutes, greater than 50% of the time was in counseling and coordination of care. Follow-up in 6 weeks. Kelton Tan MD, Reed Rider

## 2018-07-23 NOTE — MR AVS SNAPSHOT
Charline Toro 
 
 
 [place] [address] [address] [facility] 
[phone] Patient: Oliver Stapleton MRN: [id number] ZKR:6/[id number]/0124 Visit Information Date & Time Provider Department Dept. Phone Encounter #  
 7/23/2018  3:45 PM Yancy Kwon MD [address] Oncology [phone] [id number] Follow-up Instructions Return in about 6 weeks (around 9/3/2018). Follow-up and Disposition History Your Appointments 9/10/2018  3:00 PM  
Office Visit with Yancy Kwon MD  
[address] Oncology [address] [facility]) Appt Note: 6 WK RET  
 [address], [address] [place] [address] Upcoming Health Maintenance Date Due DTaP/Tdap/Td series (1 - Tdap) 1/6/2006 PAP AKA CERVICAL CYTOLOGY 1/6/2006 Influenza Age 5 to Adult 8/1/2018 Allergies as of 7/23/2018  Review Complete On: 7/23/2018 By: Yancy Kwon MD  
  
 Severity Noted Reaction Type Reactions Macrobid [Nitrofurantoin Monohyd/m-cryst]  08/17/2017    Nausea and Vomiting Morphine  08/17/2017    Itching Vicodin [Hydrocodone-acetaminophen]  08/17/2017    Nausea and Vomiting, Other (comments) Current Immunizations  Never Reviewed No immunizations on file. Not reviewed this visit You Were Diagnosed With   
  
 Codes Comments Iron deficiency anemia due to chronic blood loss    -  Primary ICD-10-CM: D50.0 ICD-9-CM: 280.0 Menorrhagia with regular cycle     ICD-10-CM: N92.0 ICD-9-CM: 626.2 Vitals BP Pulse Temp Resp Weight(growth percentile) BMI  
 125/78 (BP 1 Location: Right arm, BP Patient Position: Sitting) 78 98.6 °F (37 °C) (Oral) 18 200 lb 9.6 oz (91 kg) 35.53 kg/m2 OB Status Smoking Status Unknown Never Smoker BMI and BSA Data Body Mass Index Body Surface Area 35.53 kg/m 2 2.01 m 2 Preferred Pharmacy Pharmacy Name Phone [facility] [phone] - [place], [address] AT [address] [phone] Your Updated Medication List  
  
   
This list is accurate as of 7/23/18  4:09 PM.  Always use your most recent med list.  
  
  
  
  
 docusate sodium 100 mg capsule Commonly known as:  Elvira Mule Take 1 Cap by mouth two (2) times a day for 90 days. ergocalciferol 50,000 unit capsule Commonly known as:  ERGOCALCIFEROL Take 1 Cap by mouth every seven (7) days. multivitamin tablet Commonly known as:  ONE A DAY Take 1 Tab by mouth daily. Follow-up Instructions Return in about 6 weeks (around 9/3/2018). Patient Instructions Iron Deficiency Anemia: Care Instructions Your Care Instructions Anemia means that you do not have enough red blood cells. Red blood cells carry oxygen around your body. When you have anemia, it can make you pale, weak, and tired. Many things can cause anemia. The most common cause is loss of blood. This can happen if you have heavy menstrual periods. It can also happen if you have bleeding in your stomach or bowel. You can also get anemia if you don't have enough iron in your diet or if it's hard for your body to absorb iron. In some cases, pregnancy causes anemia. That's because a pregnant woman needs more iron. Your doctor may do more tests to find the cause of your anemia. If a disease or other health problem is causing it, your doctor will treat that problem. It's important to follow up with your doctor to make sure that your iron level returns to normal. 
Follow-up care is a key part of your treatment and safety. Be sure to make and go to all appointments, and call your doctor if you are having problems. It's also a good idea to know your test results and keep a list of the medicines you take. How can you care for yourself at home? · If your doctor recommended iron pills, take them as directed. ¨ Try to take the pills on an empty stomach. You can do this about 1 hour before or 2 hours after meals. But you may need to take iron with food to avoid an upset stomach. ¨ Do not take antacids or drink milk or anything with caffeine within 2 hours of when you take your iron. They can keep your body from absorbing the iron well. ¨ Vitamin C helps your body absorb iron. You may want to take iron pills with a glass of orange juice or some other food high in vitamin C. 
¨ Iron pills may cause stomach problems. These include heartburn, nausea, diarrhea, constipation, and cramps. It can help to drink plenty of fluids and include fruits, vegetables, and fiber in your diet. ¨ It's normal for iron pills to make your stool a greenish or grayish black. But internal bleeding can also cause dark stool. So it's important to tell your doctor about any color changes. ¨ Call your doctor if you think you are having a problem with your iron pills. Even after you start to feel better, it will take several months for your body to build up its supply of iron. ¨ If you miss a pill, don't take a double dose. ¨ Keep iron pills out of the reach of small children. Too much iron can be very dangerous. · Eat foods with a lot of iron. These include red meat, shellfish, poultry, and eggs. They also include beans, raisins, whole-grain bread, and leafy green vegetables. · Steam your vegetables. This is the best way to prepare them if you want to get as much iron as possible. · Be safe with medicines. Do not take nonsteroidal anti-inflammatory pain relievers unless your doctor tells you to. These include aspirin, naproxen (Aleve), and ibuprofen (Advil, Motrin). · Liquid iron can stain your teeth. But you can mix it with water or juice and drink it with a straw. Then it won't get on your teeth. When should you call for help? Call 911 anytime you think you may need emergency care. For example, call if: 
  · You passed out (lost consciousness).  
 Call your doctor now or seek immediate medical care if: 
  · You are short of breath.  
  · You are dizzy or light-headed, or you feel like you may faint.  
  · You have new or worse bleeding.  
 Watch closely for changes in your health, and be sure to contact your doctor if: 
  · You feel weaker or more tired than usual.  
  · You do not get better as expected. Where can you learn more? Go to http://viraj-devora.info/. Enter F162 in the search box to learn more about \"Iron Deficiency Anemia: Care Instructions. \" Current as of: October 9, 2017 Content Version: 11.7 © 1851-8270 [employer]. Care instructions adapted under license by [employer] (which disclaims liability or warranty for this information). If you have questions about a medical condition or this instruction, always ask your healthcare professional. [place] [address] any warranty or liability for your use of this information. Introducing [facility] & HEALTH SERVICES! Dear Nena Dumont: Thank you for requesting a [employer] account. Our records indicate that you already have an active [employer] account. You can access your account anytime at [email or web address] Did you know that you can access your hospital and ER discharge instructions at any time in [employer]? You can also review all of your test results from your hospital stay or ER visit. Additional Information If you have questions, please visit the Frequently Asked Questions section of the [employer] website at [email or web address] Remember, [employer] is NOT to be used for urgent needs. For medical emergencies, dial 911. Now available from your iPhone and Android! Please provide this summary of care documentation to your next provider. Your primary care clinician is listed as Donna Matthews. If you have any questions after today's visit, please call [phone].

## 2018-07-27 ENCOUNTER — HOSPITAL ENCOUNTER (OUTPATIENT)
Dept: INFUSION THERAPY | Age: 33
Discharge: HOME | End: 2018-07-27
Payer: COMMERCIAL

## 2018-07-27 VITALS
OXYGEN SATURATION: 97 % | DIASTOLIC BLOOD PRESSURE: 77 MMHG | SYSTOLIC BLOOD PRESSURE: 119 MMHG | BODY MASS INDEX: 35.44 KG/M2 | HEART RATE: 73 BPM | TEMPERATURE: 97.6 F | WEIGHT: 200 LBS | RESPIRATION RATE: 18 BRPM | HEIGHT: 63 IN

## 2018-07-27 PROCEDURE — 96365 THER/PROPH/DIAG IV INF INIT: CPT

## 2018-07-27 PROCEDURE — 96366 THER/PROPH/DIAG IV INF ADDON: CPT

## 2018-07-27 PROCEDURE — 74011250636 HC RX REV CODE- 250/636: Performed by: INTERNAL MEDICINE

## 2018-07-27 RX ORDER — SODIUM CHLORIDE 0.9 % (FLUSH) 0.9 %
10-40 SYRINGE (ML) INJECTION AS NEEDED
Status: DISCONTINUED | OUTPATIENT
Start: 2018-07-27 | End: 2018-07-31 | Stop reason: HOSPADM

## 2018-07-27 RX ADMIN — Medication 10 ML: at 15:29

## 2018-07-27 RX ADMIN — Medication 10 ML: at 13:42

## 2018-07-27 RX ADMIN — IRON SUCROSE 250 MG: 20 INJECTION, SOLUTION INTRAVENOUS at 13:43

## 2018-07-27 NOTE — PROGRESS NOTES
[address] [facility] Progress Note Date: 2018 Name: Jaquan Barahona MRN: [id number] : 1985 Venofer infusion 1 of 4 every 14 days Ms. Mcfadden was assessed and education was provided. Care notes of Venofer given and reviewed with patient Ms. Mcfadden's vitals were reviewed and patient was observed for 5 minutes prior to treatment. Visit Vitals  /77 (BP 1 Location: Left arm, BP Patient Position: Sitting)  Pulse 73  Temp 97.6 °F (36.4 °C)  Resp 18  Ht 5' 3\" (1.6 m)  Wt 90.7 kg (200 lb)  SpO2 97%  Breastfeeding No  
 BMI 35.43 kg/m2 Patient Vitals for the past 12 hrs: 
 Temp Pulse Resp BP SpO2  
18 1611 - 73 18 119/77 -  
18 1328 97.6 °F (36.4 °C) 71 18 107/71 97 % Lab results were obtained and reviewed 2018. No results found for this or any previous visit (from the past 12 hour(s)). No pre-medications were ordered and Venofer was initiated after IV started in LAC w/22 gauge INT w/o difficulty. Good blood return obtained, followed with 10 ml NS flush. Brett Miller Venofer 250 mg/262 ml was infused over 105 minutes. At completion of infusion, swelling was noted right of IV site. IV flushed with 2 ml NS and removed, 2x2 guaze and Coban applied. Patient denied pain, warm compress applied. Patient instructed to use cold compress for 20 minutes every 2-3 hours for swelling or pain. Ms. Leana Slaughter tolerated the infusion, and had no complaints. Patient armband removed and shredded. Ms. Leana Slaughter was discharged from [place] [address] in stable condition at [phone]. She is to return on 8/10/30049  at 1300 for her next appointment for Venofer infusion 2 of 4.  
 
Fay Hollins RN 
2018 
5:31 PM

## 2018-07-27 NOTE — PROGRESS NOTES
[address] July 27, 2018 RE: Aicha Hook To Whom It May Concern, This is to certify that Aicha Hook may 7/27/2018. Patient received an iron infusion today. Please feel free to contact my office if you have any questions or concerns. Thank you for your assistance in this matter. Sincerely, Rudolph Del Real RN

## 2018-08-10 ENCOUNTER — HOSPITAL ENCOUNTER (OUTPATIENT)
Dept: INFUSION THERAPY | Age: 33
Discharge: HOME | End: 2018-08-10
Payer: COMMERCIAL

## 2018-08-10 VITALS
RESPIRATION RATE: 18 BRPM | OXYGEN SATURATION: 100 % | DIASTOLIC BLOOD PRESSURE: 61 MMHG | SYSTOLIC BLOOD PRESSURE: 100 MMHG | TEMPERATURE: 98.4 F | HEART RATE: 74 BPM

## 2018-08-10 PROCEDURE — 74011250636 HC RX REV CODE- 250/636: Performed by: INTERNAL MEDICINE

## 2018-08-10 PROCEDURE — 96366 THER/PROPH/DIAG IV INF ADDON: CPT

## 2018-08-10 PROCEDURE — 96365 THER/PROPH/DIAG IV INF INIT: CPT

## 2018-08-10 RX ORDER — SODIUM CHLORIDE 0.9 % (FLUSH) 0.9 %
10-40 SYRINGE (ML) INJECTION AS NEEDED
Status: DISCONTINUED | OUTPATIENT
Start: 2018-08-10 | End: 2018-08-14 | Stop reason: HOSPADM

## 2018-08-10 RX ADMIN — Medication 10 ML: at 15:10

## 2018-08-10 RX ADMIN — IRON SUCROSE 250 MG: 20 INJECTION, SOLUTION INTRAVENOUS at 13:30

## 2018-08-10 RX ADMIN — Medication 10 ML: at 13:28

## 2018-08-10 NOTE — PROGRESS NOTES
[address] August 10, 2018       RE: Shelby Mcfadden      To Whom It May Concern,    This is to certify that [address] may return to work today at     Please feel free to contact my office if you have any questions or concerns. Thank you for your assistance in this matter.       Sincerely,  Mnany Stroud RN

## 2018-08-10 NOTE — PROGRESS NOTES
[facility] Progress Note    Date: August 10, 2018    Name: Dong Fletcher    MRN: [id number]         : 1985     Venofer infusion 2 of 4 every 14 days    Patient arrived at 1315 ambulatory to [facility]    Ms. Mcfadden was assessed and education was provided. No complaints or concerns voiced. Patient states she had no problems with first venofer infusion    Ms. Mcfadden's vitals were reviewed and patient was observed for 5 minutes prior to treatment. Visit Vitals    /61 (BP 1 Location: Left arm, BP Patient Position: At rest)    Pulse 74    Temp 98.4 °F (36.9 °C)    Resp 18    SpO2 100%        IV started in [facility] [place], operated by [facility] w/24 gauge needle on the 2nd attempt. Good blood return obtained, followed with 10 ml NS flush. .     Venofer 250 mg infused over 90 minutes At completion of infusion, slight puffiness and discoloration was noted above, below, and around IV site. IV flushed with 2 ml NS, and blood returns was noted. PIV removed, 2x2 guaze and Coban applied. Patient denied pain to IV area. No irritation to IV site on removal.    Ms. Mcfadden tolerated the infusion, and had no complaints. She declined to stay for observation    Patient Vitals for the past 8 hrs:   Temp Pulse Resp BP SpO2   08/10/18 1515 98.4 °F (36.9 °C) 74 18 100/61 100 %   08/10/18 1315 98.9 °F (37.2 °C) 70 18 112/73 97 %           Patient armband removed and shredded. Ms. Shirl Barthel was discharged from [place] [address] in stable condition at [id number]. She is to return on   at 1300 for her next appointment for Venofer infusion 3 of 4.     Yoel Arizmendi RN  August 10, 2018  5:31 PM

## 2018-08-24 ENCOUNTER — HOSPITAL ENCOUNTER (OUTPATIENT)
Dept: INFUSION THERAPY | Age: 33
Discharge: HOME | End: 2018-08-24
Payer: COMMERCIAL

## 2018-08-24 VITALS
TEMPERATURE: 98.1 F | SYSTOLIC BLOOD PRESSURE: 106 MMHG | HEART RATE: 76 BPM | OXYGEN SATURATION: 99 % | DIASTOLIC BLOOD PRESSURE: 68 MMHG | RESPIRATION RATE: 18 BRPM

## 2018-08-24 PROCEDURE — 96366 THER/PROPH/DIAG IV INF ADDON: CPT

## 2018-08-24 PROCEDURE — 74011250636 HC RX REV CODE- 250/636: Performed by: INTERNAL MEDICINE

## 2018-08-24 PROCEDURE — 96365 THER/PROPH/DIAG IV INF INIT: CPT

## 2018-08-24 RX ORDER — SODIUM CHLORIDE 0.9 % (FLUSH) 0.9 %
10-40 SYRINGE (ML) INJECTION AS NEEDED
Status: DISCONTINUED | OUTPATIENT
Start: 2018-08-24 | End: 2018-08-28 | Stop reason: HOSPADM

## 2018-08-24 RX ADMIN — Medication 10 ML: at 15:05

## 2018-08-24 RX ADMIN — Medication 10 ML: at 13:28

## 2018-08-24 RX ADMIN — IRON SUCROSE 250 MG: 20 INJECTION, SOLUTION INTRAVENOUS at 13:30

## 2018-09-07 ENCOUNTER — HOSPITAL ENCOUNTER (OUTPATIENT)
Dept: INFUSION THERAPY | Age: 33
Discharge: HOME | End: 2018-09-07
Payer: COMMERCIAL

## 2018-09-07 VITALS
TEMPERATURE: 98.6 F | HEART RATE: 76 BPM | SYSTOLIC BLOOD PRESSURE: 118 MMHG | RESPIRATION RATE: 20 BRPM | OXYGEN SATURATION: 94 % | DIASTOLIC BLOOD PRESSURE: 76 MMHG

## 2018-09-07 PROCEDURE — 74011250636 HC RX REV CODE- 250/636: Performed by: INTERNAL MEDICINE

## 2018-09-07 PROCEDURE — 96366 THER/PROPH/DIAG IV INF ADDON: CPT

## 2018-09-07 PROCEDURE — 96365 THER/PROPH/DIAG IV INF INIT: CPT

## 2018-09-07 RX ORDER — SODIUM CHLORIDE 0.9 % (FLUSH) 0.9 %
10-40 SYRINGE (ML) INJECTION AS NEEDED
Status: DISCONTINUED | OUTPATIENT
Start: 2018-09-07 | End: 2018-09-11 | Stop reason: HOSPADM

## 2018-09-07 RX ADMIN — IRON SUCROSE 250 MG: 20 INJECTION, SOLUTION INTRAVENOUS at 13:32

## 2018-09-07 RX ADMIN — Medication 10 ML: at 15:23

## 2018-09-07 NOTE — PROGRESS NOTES
[facility] Progress Note Date: 2018 Name: Suzi Castaneda MRN: [id number] : 1985 Venofer infusion 4 of 4 every 14 days Assumed care of patient from [place] [address]. Visit Vitals  /76 (BP 1 Location: Left arm, BP Patient Position: At rest;Sitting)  Pulse 76  Temp 98.6 °F (37 °C)  Resp 20  SpO2 94% Venofer 250 mg infused over 90 minutes. Ms. Latonya Nance tolerated the infusion, and had no complaints. She declined to stay for observation PIV removed. Site without redness, infiltration, bruising or tenderness. Gauze/coban applied Patient Vitals for the past 8 hrs: 
 Temp Pulse Resp BP SpO2  
18 1527 98.6 °F (37 °C) 76 20 118/76 -  
18 1320 98 °F (36.7 °C) 75 20 113/75 94 % Reviewed discharge instructions with patient. She verbalized understanding Patient armband removed and shredded. Ms. Latonya Nance was discharged from [place] [address] in stable condition at 1525. She has juan jose further appt with [facility]. Jin Card RN 2018 
[id number]

## 2018-09-07 NOTE — PROGRESS NOTES
[facility] Progress Note Date: 2018 Name: Errol Nicole MRN: [id number] : 1985 Venofer infusion 4 of 4 every 14 days Patient arrived at 1320 ambulatory to [facility] Ms. Mcfadden was assessed and education was provided. No complaints or concerns voiced. Ms. Mcfadden's vitals were reviewed and patient was observed for 5 minutes prior to treatment. Visit Vitals  /75 (BP 1 Location: Left arm, BP Patient Position: At rest)  Pulse 75  Temp 98 °F (36.7 °C)  Resp 20  SpO2 94% IV started in Left Hand w/22 gauge needle x1 attempt. Good blood return obtained, followed with 10 ml NS flush. Kemi Mann Venofer 250 mg infusing at this time. Ms. Erlin Hogan denies any problems at this time and tolerating the venofer infusion. Care of patient turned over to [facility]. Patient Vitals for the past 8 hrs: 
 Temp Pulse Resp BP SpO2  
18 1320 98 °F (36.7 °C) 75 20 113/75 94 % Omayra Reaves RN 2018

## 2018-09-12 ENCOUNTER — HOSPITAL ENCOUNTER (OUTPATIENT)
Dept: ONCOLOGY | Age: 33
Discharge: HOME | End: 2018-09-12

## 2018-09-12 ENCOUNTER — OFFICE VISIT (OUTPATIENT)
Dept: ONCOLOGY | Age: 33
End: 2018-09-12

## 2018-09-12 VITALS
RESPIRATION RATE: 16 BRPM | SYSTOLIC BLOOD PRESSURE: 124 MMHG | WEIGHT: 203 LBS | HEART RATE: 69 BPM | TEMPERATURE: 98.4 F | HEIGHT: 63 IN | BODY MASS INDEX: 35.97 KG/M2 | DIASTOLIC BLOOD PRESSURE: 76 MMHG

## 2018-09-12 DIAGNOSIS — D50.0 IRON DEFICIENCY ANEMIA DUE TO CHRONIC BLOOD LOSS: ICD-10-CM

## 2018-09-12 DIAGNOSIS — D50.0 IRON DEFICIENCY ANEMIA DUE TO CHRONIC BLOOD LOSS: Primary | ICD-10-CM

## 2018-09-12 LAB
BASO+EOS+MONOS # BLD AUTO: 0.5 K/UL (ref 0–2.3)
BASO+EOS+MONOS # BLD AUTO: 10 % (ref 0.1–17)
DIFFERENTIAL METHOD BLD: ABNORMAL
ERYTHROCYTE [DISTWIDTH] IN BLOOD BY AUTOMATED COUNT: 19.3 % (ref 11.5–14.5)
HCT VFR BLD AUTO: 36.2 % (ref 36–48)
HGB BLD-MCNC: 12.1 G/DL (ref 12–16)
LYMPHOCYTES # BLD: 1.4 K/UL (ref 1.1–5.9)
LYMPHOCYTES NFR BLD: 26 % (ref 14–44)
MCH RBC QN AUTO: 27.1 PG (ref 25–35)
MCHC RBC AUTO-ENTMCNC: 33.4 G/DL (ref 31–37)
MCV RBC AUTO: 81 FL (ref 78–102)
NEUTS SEG # BLD: 3.5 K/UL (ref 1.8–9.5)
NEUTS SEG NFR BLD: 64 % (ref 40–70)
PLATELET # BLD AUTO: 241 K/UL (ref 140–440)
RBC # BLD AUTO: 4.47 M/UL (ref 4.1–5.1)
WBC # BLD AUTO: 5.4 K/UL (ref 4.5–13)

## 2018-09-12 NOTE — LETTER
NOTIFICATION RETURN TO WORK / SCHOOL 
 
9/12/2018 10:16 AM 
 
Ms. Mir Mcfadden [address] [address] [address] [address] To Whom It May Concern: Sangita Saldivar is currently under the care of [address] [place] [address]. She will return to work/school on: 09/12/2018 If there are questions or concerns please have the patient contact our office. Sincerely, Sindy aMlik MD

## 2018-09-12 NOTE — PATIENT INSTRUCTIONS
Iron Deficiency Anemia: Care Instructions  Your Care Instructions    Anemia means that you do not have enough red blood cells. Red blood cells carry oxygen around your body. When you have anemia, it can make you pale, weak, and tired. Many things can cause anemia. The most common cause is loss of blood. This can happen if you have heavy menstrual periods. It can also happen if you have bleeding in your stomach or bowel. You can also get anemia if you don't have enough iron in your diet or if it's hard for your body to absorb iron. In some cases, pregnancy causes anemia. That's because a pregnant woman needs more iron. Your doctor may do more tests to find the cause of your anemia. If a disease or other health problem is causing it, your doctor will treat that problem. It's important to follow up with your doctor to make sure that your iron level returns to normal.  Follow-up care is a key part of your treatment and safety. Be sure to make and go to all appointments, and call your doctor if you are having problems. It's also a good idea to know your test results and keep a list of the medicines you take. How can you care for yourself at home? · If your doctor recommended iron pills, take them as directed. ¨ Try to take the pills on an empty stomach. You can do this about 1 hour before or 2 hours after meals. But you may need to take iron with food to avoid an upset stomach. ¨ Do not take antacids or drink milk or anything with caffeine within 2 hours of when you take your iron. They can keep your body from absorbing the iron well. ¨ Vitamin C helps your body absorb iron. You may want to take iron pills with a glass of orange juice or some other food high in vitamin C.  ¨ Iron pills may cause stomach problems. These include heartburn, nausea, diarrhea, constipation, and cramps. It can help to drink plenty of fluids and include fruits, vegetables, and fiber in your diet.   ¨ It's normal for iron pills to make your stool a greenish or grayish black. But internal bleeding can also cause dark stool. So it's important to tell your doctor about any color changes. ¨ Call your doctor if you think you are having a problem with your iron pills. Even after you start to feel better, it will take several months for your body to build up its supply of iron. ¨ If you miss a pill, don't take a double dose. ¨ Keep iron pills out of the reach of small children. Too much iron can be very dangerous. · Eat foods with a lot of iron. These include red meat, shellfish, poultry, and eggs. They also include beans, raisins, whole-grain bread, and leafy green vegetables. · Steam your vegetables. This is the best way to prepare them if you want to get as much iron as possible. · Be safe with medicines. Do not take nonsteroidal anti-inflammatory pain relievers unless your doctor tells you to. These include aspirin, naproxen (Aleve), and ibuprofen (Advil, Motrin). · Liquid iron can stain your teeth. But you can mix it with water or juice and drink it with a straw. Then it won't get on your teeth. When should you call for help? Call 911 anytime you think you may need emergency care. For example, call if:    · You passed out (lost consciousness).    Call your doctor now or seek immediate medical care if:    · You are short of breath.     · You are dizzy or light-headed, or you feel like you may faint.     · You have new or worse bleeding.    Watch closely for changes in your health, and be sure to contact your doctor if:    · You feel weaker or more tired than usual.     · You do not get better as expected. Where can you learn more? Go to http://viraj-devora.info/. Enter F994 in the search box to learn more about \"Iron Deficiency Anemia: Care Instructions. \"  Current as of: October 9, 2017  Content Version: 11.7  © 0534-1368 [employer].  Care instructions adapted under license by [employer] (which disclaims liability or warranty for this information). If you have questions about a medical condition or this instruction, always ask your healthcare professional. [address] any warranty or liability for your use of this information. Complete Blood Count (CBC): About This Test  What is it? A complete blood count (CBC) is a blood test that gives important information about your blood cells, especially red blood cells, white blood cells, and platelets. Why is this test done? A CBC may be done as part of a regular physical exam. There are many other reasons that a doctor may want this blood test, including to:  · Find the cause of symptoms such as fatigue, weakness, fever, bruising, or weight loss. · Find anemia or an infection. · See how much blood has been lost if there is bleeding. · Diagnose diseases of the blood, such as leukemia or polycythemia. How can you prepare for the test?  You do not need to do anything before having this test.  What happens during the test?  The health professional taking a sample of your blood will:  · Wrap an elastic band around your upper arm. This makes the veins below the band larger so it is easier to put a needle into the vein. · Clean the needle site with alcohol. · Put the needle into the vein. · Attach a tube to the needle to fill it with blood. · Remove the band from your arm when enough blood is collected. · Put a gauze pad or cotton ball over the needle site as the needle is removed. · Put pressure on the site and then put on a bandage. If this blood test is done on a baby, a heel stick may be done instead of a blood draw from a vein. What happens after the test?  · You will probably be able to go home right away. · You can go back to your usual activities right away. Follow-up care is a key part of your treatment and safety. Be sure to make and go to all appointments, and call your doctor if you are having problems. It's also a good idea to keep a list of the medicines you take. Ask your doctor when you can expect to have your test results. Where can you learn more? Go to http://viraj-devora.info/. Enter X374 in the search box to learn more about \"Complete Blood Count (CBC): About This Test.\"  Current as of: October 9, 2017  Content Version: 11.7  © 1617-0190 [employer]. Care instructions adapted under license by [employer] (which disclaims liability or warranty for this information). If you have questions about a medical condition or this instruction, always ask your healthcare professional. [address] any warranty or liability for your use of this information.

## 2018-09-12 NOTE — PROGRESS NOTES
Hematology/Oncology  Progress Note    Name: Denise Bryson  Date: 2018  : 1985    Pool De La Garza DO     Ms. Diane Haji is a 35y.o. year old female who was seen for Severe iron deficiency anemia     Venofer as needed. Last dose completed on 2018  Subjective:     Ms. Diane Haji is a 80-year-old woman who was referred for an evaluation of anemia. CBC on 2018 revealed a WBC count of 4.4, hemoglobin 9.7 g/dL, hematocrit 30.5%, and her platelet count was 232,364. A hemoglobin electrophoresis revealed normal adult hemoglobin. The iron profile revealed that she had an iron level of 18 mcg/dL with an iron saturation of only 4%. Her ferritin level was quite low at only 11 ng/mL. Based on these results she has severe iron deficiency anemia. Therefore, she was ordered for intravenous iron replenishment therapy with Venofer at a dose of 250 mg every 2 weeks for 4 complete doses. she has completed this on 2018. She is in the office today for follow up. She denies shortness of breath, fatigue or weakness. She report that she dose get heavy menstrual periods and she has an appointment with her GYN. She denies any concern or issues at this time. Past medical history, family history, and social history: these were reviewed and remains unchanged. Past Medical History:   Diagnosis Date    Anemia     GERD (gastroesophageal reflux disease)     History of blood product transfusion      Past Surgical History:   Procedure Laterality Date    HX CARPAL TUNNEL RELEASE      bilat    HX MYOMECTOMY      HX RIGHT SALPINGO-OOPHORECTOMY       Social History     Social History    Marital status:      Spouse name: N/A    Number of children: N/A    Years of education: N/A     Occupational History    Not on file.      Social History Main Topics    Smoking status: Never Smoker    Smokeless tobacco: Never Used    Alcohol use Yes      Comment: socially    Drug use: No    Sexual activity: Yes Other Topics Concern    Not on file     Social History Narrative     Family History   Problem Relation Age of Onset    Hypertension Father     Diabetes Father     Diabetes Maternal Grandmother     Hypertension Maternal Grandmother     Arthritis-rheumatoid Maternal Grandfather     Diabetes Paternal Grandmother     Hypertension Paternal Grandmother    [facility] Arthritis-rheumatoid Paternal Grandfather     Thyroid Disease Sister     No Known Problems Brother          Review of Systems  Constitutional: The patient has no acute distress or discomfort. HEENT: The patient denies recent head trauma, eye pain, blurred vision,  hearing deficit, oropharyngeal mucosal pain or lesions, and the patient denies throat pain or discomfort. Lymphatics: The patient denies palpable peripheral lymphadenopathy. Hematologic: The patient denies having bruising, bleeding, or progressive fatigue. Respiratory: Patient denies having shortness of breath, cough, sputum production, fever, or dyspnea on exertion. Cardiovascular: The patient denies having leg pain, leg swelling, heart palpitations, chest permit, chest pain, or lightheadedness. The patient denies having dyspnea on exertion. Gastrointestinal: The patient denies having nausea, emesis, or diarrhea. The patient denies having any hematemesis or blood in the stool. Genitourinary: Patient denies having urinary urgency, frequency, or dysuria. The patient denies having blood in the urine. Psychological: The patient denies having symptoms of nervousness, anxiety, depression, or thoughts of harming himself some of this. Skin: Patient denies having skin rashes, skin, ulcerations, or unexplained itching or pruritus. Musculoskeletal: The patient denies having pain in the joints or bones.       Objective:     Visit Vitals    /76 (BP 1 Location: Right arm, BP Patient Position: Sitting)    Pulse 69    Temp 98.4 °F (36.9 °C) (Oral)    Resp 16    Ht 5' 3\" (1.6 m)    [employer] 92.1 kg (203 lb)    BMI 35.96 kg/m2     ECOG PS=0; pain score=0/10    Physical Exam:   Gen. Appearance: The patient is in no acute distress. Skin: There is no bruise or rash. HEENT: The exam is unremarkable. Neck: Supple without lymphadenopathy or thyromegaly. Lungs: Clear to auscultation and percussion; there are no wheezes or rhonchi. Heart: Regular rate and rhythm; there are no murmurs, gallops, or rubs. Abdomen: Bowel sounds are present and normal.  There is no guarding, tenderness, or hepatosplenomegaly. Extremities: There is no clubbing, cyanosis, or edema. Neurologic: There are no focal neurologic deficits. Lymphatics: There is no palpable peripheral lymphadenopathy. Musculoskeletal: The patient has full range of motion at all joints. There is no evidence of joint deformity or effusions. There is no focal joint tenderness. Psychological/psychiatric: There is no clinical evidence of anxiety, depression, or melancholy. Lab data:      Results for orders placed or performed during the hospital encounter of 09/12/18   CBC WITH 3 PART DIFF     Status: Abnormal   Result Value Ref Range Status    WBC 5.4 4.5 - 13.0 K/uL Final    RBC 4.47 4.10 - 5.10 M/uL Final    HGB 12.1 12.0 - 16 g/dL Final    HCT 36.2 36 - 48 % Final    MCV 81.0 78 - 102 FL Final    MCH 27.1 25.0 - 35.0 PG Final    MCHC 33.4 31 - 37 g/dL Final    RDW 19.3 (H) 11.5 - 14.5 % Final    PLATELET 020 217 - 118 K/uL Final    NEUTROPHILS 64 40 - 70 % Final    MIXED CELLS 10 0.1 - 17 % Final    LYMPHOCYTES 26 14 - 44 % Final    ABS. NEUTROPHILS 3.5 1.8 - 9.5 K/UL Final    ABS. MIXED CELLS 0.5 0.0 - 2.3 K/uL Final    ABS. LYMPHOCYTES 1.4 1.1 - 5.9 K/UL Final     Comment: Test performed at [place] [address] Location. Results Reviewed by Medical Director. DF AUTOMATED   Final           Assessment:     1.  Iron deficiency anemia due to chronic blood loss      Plan:   Anemia: I have inform patient that her CBC for today shows a WBC of 5.4 and hematocrit of 36.2 %  With hemoglobin of 12.1 g/dl. PLT of 241,000. RDW of 19.3. The rest of her CBC is within normal range. She completed her Venofer on 9/7/2018. I will order iron profile and ferritin as well as CMP for today. Follow up in 12 weeks       Orders Placed This Encounter    COMPLETE CBC & AUTO DIFF WBC    InHouse CBC ([employer])     Standing Status:   Future     Number of Occurrences:   1     Standing Expiration Date:   9/19/2018    IRON PROFILE     Standing Status:   Future     Number of Occurrences:   1     Standing Expiration Date:   9/13/2019    FERRITIN     Standing Status:   Future     Number of Occurrences:   1     Standing Expiration Date:   7/22/8382    METABOLIC PANEL, COMPREHENSIVE     Standing Status:   Future     Number of Occurrences:   1     Standing Expiration Date:   9/13/2019       Karoline Krueger NP  9/12/2018       I have assessed the patient independently and  agree with the full assessment as outlined. Kita Otero MD, FACP      Please note: This document has been produced using voice recognition software. Unrecognized errors in transcription may be present.

## 2018-09-12 NOTE — MR AVS SNAPSHOT
[address] [facility] 
 
 
 [address], [address] [facility] 
[phone] Patient: Clari Landis MRN: [id number] [id number] Visit Information Date & Time Provider Department Dept. Phone Encounter #  
 9/12/2018  9:30 AM Bobby Oconnell MD [facility] Oncology [phone] [id number] Your Appointments 11/21/2018 11:00 AM  
Office Visit with Bobby Oconnell MD  
[address]  Oncology [facility] Appt Note: 10 WK RET  
 [address] [facility], [place] [address], [address] [place] [address] Upcoming Health Maintenance Date Due DTaP/Tdap/Td series (1 - Tdap) 1/6/2006 PAP AKA CERVICAL CYTOLOGY 1/6/2006 Influenza Age 5 to Adult 8/1/2018 Allergies as of 9/12/2018  Review Complete On: 9/12/2018 By: Christopher Median Severity Noted Reaction Type Reactions Macrobid [Nitrofurantoin Monohyd/m-cryst]  08/17/2017    Nausea and Vomiting Morphine  08/17/2017    Itching Vicodin [Hydrocodone-acetaminophen]  08/17/2017    Nausea and Vomiting, Other (comments) Current Immunizations  Reviewed on 8/24/2018 Name Date Influenza Vaccine 10/1/2017 Not reviewed this visit You Were Diagnosed With   
  
 Codes Comments Iron deficiency anemia due to chronic blood loss    -  Primary ICD-10-CM: D50.0 ICD-9-CM: 280.0 Vitals BP Pulse Temp Resp Height(growth percentile) Weight(growth percentile) 124/76 (BP 1 Location: Right arm, BP Patient Position: Sitting) 69 98.4 °F (36.9 °C) (Oral) 16 5' 3\" (1.6 m) 203 lb (92.1 kg) BMI OB Status Smoking Status 35.96 kg/m2 Unknown Never Smoker BMI and BSA Data Body Mass Index Body Surface Area 35.96 kg/m 2 2.02 m 2 Preferred Pharmacy Pharmacy Name Phone [phone] - [address], [phone] Your Updated Medication List  
  
Notice  As of 9/12/2018 10:17 AM  
 You have not been prescribed any medications. We Performed the Following COMPLETE CBC & AUTO DIFF WBC [60767 CPT(R)] To-Do List   
 09/12/2018 Lab:  CBC WITH 3 PART DIFF Introducing [facility] SERVICES! Dear Magy Hensley: Thank you for requesting a [employer] account. Our records indicate that you already have an active [employer] account. You can access your account anytime at [email or web address]ge Did you know that you can access your hospital and ER discharge instructions at any time in [employer]? You can also review all of your test results from your hospital stay or ER visit. Additional Information If you have questions, please visit the Frequently Asked Questions section of the [employer] website at [email or web address]/. Remember, [employer] is NOT to be used for urgent needs. For medical emergencies, dial 911. Now available from your iPhone and Android! Please provide this summary of care documentation to your next provider. Your primary care clinician is listed as Aleah Iniguez. If you have any questions after today's visit, please call [phone].

## 2018-09-13 LAB
ALBUMIN SERPL-MCNC: 4 G/DL (ref 3.5–5.5)
ALBUMIN/GLOB SERPL: 1.5 {RATIO} (ref 1.2–2.2)
ALP SERPL-CCNC: 64 IU/L (ref 39–117)
ALT SERPL-CCNC: 20 IU/L (ref 0–32)
AST SERPL-CCNC: 20 IU/L (ref 0–40)
BILIRUB SERPL-MCNC: 0.3 MG/DL (ref 0–1.2)
BUN SERPL-MCNC: 12 MG/DL (ref 6–20)
BUN/CREAT SERPL: 18 (ref 9–23)
CALCIUM SERPL-MCNC: 8.8 MG/DL (ref 8.7–10.2)
CHLORIDE SERPL-SCNC: 106 MMOL/L (ref 96–106)
CO2 SERPL-SCNC: 21 MMOL/L (ref 20–29)
CREAT SERPL-MCNC: 0.67 MG/DL (ref 0.57–1)
FERRITIN SERPL-MCNC: 273 NG/ML (ref 15–150)
GLOBULIN SER CALC-MCNC: 2.7 G/DL (ref 1.5–4.5)
GLUCOSE SERPL-MCNC: 89 MG/DL (ref 65–99)
IRON SATN MFR SERPL: 19 % (ref 15–55)
IRON SERPL-MCNC: 60 UG/DL (ref 27–159)
POTASSIUM SERPL-SCNC: 4.3 MMOL/L (ref 3.5–5.2)
PROT SERPL-MCNC: 6.7 G/DL (ref 6–8.5)
SODIUM SERPL-SCNC: 141 MMOL/L (ref 134–144)
TIBC SERPL-MCNC: 321 UG/DL (ref 250–450)
UIBC SERPL-MCNC: 261 UG/DL (ref 131–425)

## 2019-01-21 ENCOUNTER — OFFICE VISIT (OUTPATIENT)
Dept: OBGYN CLINIC | Age: 34
End: 2019-01-21

## 2019-01-21 VITALS
RESPIRATION RATE: 18 BRPM | WEIGHT: 208 LBS | DIASTOLIC BLOOD PRESSURE: 74 MMHG | TEMPERATURE: 98.6 F | SYSTOLIC BLOOD PRESSURE: 125 MMHG | BODY MASS INDEX: 36.86 KG/M2 | HEART RATE: 86 BPM | HEIGHT: 63 IN | OXYGEN SATURATION: 97 %

## 2019-01-21 DIAGNOSIS — D50.0 IRON DEFICIENCY ANEMIA DUE TO CHRONIC BLOOD LOSS: ICD-10-CM

## 2019-01-21 DIAGNOSIS — Z12.4 CERVICAL CANCER SCREENING: ICD-10-CM

## 2019-01-21 DIAGNOSIS — Z01.419 ENCOUNTER FOR WELL WOMAN EXAM WITH ROUTINE GYNECOLOGICAL EXAM: Primary | ICD-10-CM

## 2019-01-21 PROBLEM — E66.01 SEVERE OBESITY (HCC): Status: ACTIVE | Noted: 2019-01-21

## 2019-01-21 NOTE — PROGRESS NOTES
Name: Bala Ulloa MRN: [id number] No obstetric history on file. YOB: 1985  Age: 29 y.o. Sex: female        Chief Complaint   Patient presents with    Well Woman     last pap  and it was normal never had an abnormal       HPI 34g0 LMP  presents for her well woman exam. She has h/o fibroid uterus and s/p hysteroscopy and myomectomy,  and  and RSO for hydrosalpinx in . She has h/o anemia and sees a hematologist for IV iron transfusions. Last appt was 2018. Pt has not followed up since then because she was unable to take off from work. She still has heavy periods and dysmenorrhea and is currently not on any hormonal contraceptives  Denies depression  Does not eat a well balanced diet  Does not exercise  Denies domestic abuse  Last tdap unsure  Flu vaccine   HPV vaccine not done    OB History      Para Term  AB Living    0 0 0 0 0 0    SAB TAB Ectopic Molar Multiple Live Births    0 0 0 0 0 0        Obstetric Comments    Patient's last menstrual period was 2019 (exact date).   Periods regular, last 7 days, flow heavy 1st 2 days, severe dysmenorrhea  History of sexually transmitted infections: none  Last pap:  NILM, per pt has not had an abnl pap             Social History     Substance and Sexual Activity   Sexual Activity Yes    Partners: Male    Birth control/protection: None       Past Medical History:   Diagnosis Date    Anemia     Fibroid, uterine     GERD (gastroesophageal reflux disease)     History of blood product transfusion        Past Surgical History:   Procedure Laterality Date    HX CARPAL TUNNEL RELEASE      bilat    HX MYOMECTOMY   and     HX RIGHT SALPINGO-OOPHORECTOMY      HX WISDOM TEETH EXTRACTION      at age 23       Allergies   Allergen Reactions    Macrobid [Nitrofurantoin Monohyd/M-Cryst] Nausea and Vomiting    Morphine Itching    Vicodin [Hydrocodone-Acetaminophen] Nausea and Vomiting and Other (comments)       Current Outpatient Medications on File Prior to Visit   Medication Sig Dispense Refill    multivit with iron,minerals (MULTI-VITAMINS WITH IRON PO) Take  by mouth. No current facility-administered medications on file prior to visit. Social History     Socioeconomic History    Marital status:      Spouse name: Not on file    Number of children: Not on file    Years of education: Not on file    Highest education level: Not on file   Social Needs    Financial resource strain: Not on file    Food insecurity - worry: Not on file    Food insecurity - inability: Not on file    Transportation needs - medical: Not on file   Vastari needs - non-medical: Not on file   Occupational History    Not on file   Tobacco Use    Smoking status: Never Smoker    Smokeless tobacco: Never Used   Substance and Sexual Activity    Alcohol use: Yes     Alcohol/week: 1.2 oz     Types: 1 Glasses of wine, 1 Shots of liquor per week     Comment: socially, (not at the same time)    Drug use: No    Sexual activity: Yes     Partners: Male     Birth control/protection: None   Other Topics Concern    Not on file   Social History Narrative    Not on file       Family History   Problem Relation Age of Onset    Hypertension Father     Diabetes Father     Diabetes Maternal Grandmother     Hypertension Maternal Grandmother     Arthritis-rheumatoid Maternal Grandfather     Diabetes Paternal Grandmother     Hypertension Paternal Grandmother     Arthritis-rheumatoid Paternal Grandfather     Thyroid Disease Sister     No Known Problems Brother        Review of Systems   Constitutional: Negative. Eyes: Negative. Respiratory: Negative. Cardiovascular: Negative. Gastrointestinal: Negative. Genitourinary: Negative. Musculoskeletal: Negative. Neurological: Negative. Endo/Heme/Allergies: Negative. Psychiatric/Behavioral: Negative.         Visit Vitals  /74 (BP 1 Location: Right arm, BP Patient Position: Sitting)   Pulse 86   Temp 98.6 °F (37 °C) (Oral)   Resp 18   Ht 5' 3\" (1.6 m)   Wt 208 lb (94.3 kg)   LMP 01/14/2019 (Exact Date)   SpO2 97%   BMI 36.85 kg/m²       GENERAL:  Well developed, well nourished, in no distress  NEURO/PSYCHE: Grossly intact, normal mood and affect  HEENT: Normal cephalic, atraumatic, good dentition, neck supple. No thyromegaly, pale conjunctiva  BREASTS: breasts appear normal, no suspicious masses, no skin or nipple changes  CV: regular rate and rhythm  LUNGS: clear to auscultation bilaterally, no wheezes, rhonchi or rales, good air entry with normal effort  ABDOMEN: + BS, soft without tenderness, no guarding, rebound or masses  EXTREMITIES: no edema or erythema noted  SKIN:  Warm, dry, no lesions  LYMPHATICS: No supraclavicular, axillary or inguinal nodes noted    PELVIC EXAM:  LABIA MAJORA: no masses, tenderness or lesions  LABIA MINORA: no masses, tenderness or lesions  CLITORIS: no masses, tenderness or lesions  URETHRA: normal appearing, no masses or tenderness  BLADDER: no fullness or tenderness  VAGINA: pink appearing vagina with physiologic discharge, no lesions   PERINEUM: no masses, tenderness or lesions  CERVIX: No CMT or lesions  UTERUS: small, mobile, nontender  ADNEXA: nontender and no masses    1. Encounter for well woman exam with routine gynecological exam  Reviewed diet, weight loss, exercise, and domestic abuse. Encouraged condom use every time. Reviewed tetanus vaccine. All of her questions were discussed. 2. Cervical cancer screening  - PAP IG, CT-NG-TV, APTIMA HPV AND RFX 78/80,39([id number]); Future  - PAP IG, CT-NG-TV, APTIMA HPV AND RFX 90/17,41([id number]); Future    3. Iron deficiency 2/2 chronic blood loss  On iron. Has not followed up with her hematologist. Will order cbc and iron profile today  - IRON PROFILE; Future  - FERRITIN; Future  - CBC W/O DIFF;  Future          F/U prn or in 1yr

## 2019-01-21 NOTE — PATIENT INSTRUCTIONS

## 2019-01-22 ENCOUNTER — TELEPHONE (OUTPATIENT)
Dept: OBGYN CLINIC | Age: 34
End: 2019-01-22

## 2019-01-22 LAB
ERYTHROCYTE [DISTWIDTH] IN BLOOD BY AUTOMATED COUNT: 15.1 % (ref 12.3–15.4)
FERRITIN SERPL-MCNC: 46 NG/ML (ref 15–150)
HCT VFR BLD AUTO: 30.4 % (ref 34–46.6)
HGB BLD-MCNC: 9.7 G/DL (ref 11.1–15.9)
IRON SATN MFR SERPL: 31 % (ref 15–55)
IRON SERPL-MCNC: 126 UG/DL (ref 27–159)
MCH RBC QN AUTO: 24.4 PG (ref 26.6–33)
MCHC RBC AUTO-ENTMCNC: 31.9 G/DL (ref 31.5–35.7)
MCV RBC AUTO: 76 FL (ref 79–97)
PLATELET # BLD AUTO: 348 X10E3/UL (ref 150–379)
RBC # BLD AUTO: 3.98 X10E6/UL (ref 3.77–5.28)
TIBC SERPL-MCNC: 402 UG/DL (ref 250–450)
UIBC SERPL-MCNC: 276 UG/DL (ref 131–425)
WBC # BLD AUTO: 5.5 X10E3/UL (ref 3.4–10.8)

## 2019-01-22 NOTE — TELEPHONE ENCOUNTER
----- Message from Jaquan Wallace MD sent at 1/22/2019  9:42 AM EST -----  Please patient know that her lab show that her iron is normal but she is still anemic. Her hemoglobin was 9.7. It was 12.1  4 months ago. She will need to follow up with her hematologist or PCP.

## 2019-01-22 NOTE — PROGRESS NOTES
Please patient know that her lab show that her iron is normal but she is still anemic. Her hemoglobin was 9.7. It was 12.1  4 months ago. She will need to follow up with her hematologist or PCP.

## 2019-01-22 NOTE — TELEPHONE ENCOUNTER
Call made to the pt on mobile number and it was not In service. Call then made on work number, lm for her to call the office. Labs show pt iron was normal but still anemic. Hgb was 9.1, but 12.1 four months ago.  Pt will need to f/u with her PAP or Hematologist.

## 2019-01-23 ENCOUNTER — TELEPHONE (OUTPATIENT)
Dept: OBGYN CLINIC | Age: 34
End: 2019-01-23

## 2019-01-23 NOTE — TELEPHONE ENCOUNTER
----- Message from Karina Shamra MD sent at 1/22/2019  9:42 AM EST -----  Please patient know that her lab show that her iron is normal but she is still anemic. Her hemoglobin was 9.7. It was 12.1  4 months ago. She will need to follow up with her hematologist or PCP.

## 2019-01-24 ENCOUNTER — TELEPHONE (OUTPATIENT)
Dept: OBGYN CLINIC | Age: 34
End: 2019-01-24

## 2019-01-24 LAB
C TRACH RRNA CVX QL NAA+PROBE: NEGATIVE
CYTOLOGIST CVX/VAG CYTO: NORMAL
CYTOLOGY CVX/VAG DOC THIN PREP: NORMAL
DX ICD CODE: NORMAL
HPV I/H RISK 4 DNA CVX QL PROBE+SIG AMP: NEGATIVE
Lab: NORMAL
N GONORRHOEA RRNA CVX QL NAA+PROBE: NEGATIVE
OTHER STN SPEC: NORMAL
PATH REPORT.FINAL DX SPEC: NORMAL
STAT OF ADQ CVX/VAG CYTO-IMP: NORMAL
T VAGINALIS RRNA SPEC QL NAA+PROBE: NEGATIVE

## 2019-01-24 NOTE — TELEPHONE ENCOUNTER
Ms. New Rodgers is returning your call. The best time to reach her is between 1:00 - 2:00. After 2:00 pm she can be reached on her work phone number.

## 2019-01-25 NOTE — TELEPHONE ENCOUNTER
----- Message from Uri Bartlett MD sent at 1/24/2019  6:38 PM EST -----  Please let patient know her pap smear which checks for cervical cancer came back normal

## 2019-01-25 NOTE — TELEPHONE ENCOUNTER
Call made to the pt using two Identifiers, name and  Pt made aware that her pap was normal, and that her Iron level was normal but still anemic. Hgb was 9.1 but 12.1 4 months ago. Pt advised to f/u with her PCP or Hematologist. Pt verbalized understanding.

## 2020-01-15 ENCOUNTER — OFFICE VISIT (OUTPATIENT)
Dept: FAMILY MEDICINE CLINIC | Age: 35
End: 2020-01-15

## 2020-01-15 VITALS
WEIGHT: 193.8 LBS | TEMPERATURE: 98.2 F | HEART RATE: 74 BPM | DIASTOLIC BLOOD PRESSURE: 72 MMHG | HEIGHT: 63 IN | RESPIRATION RATE: 16 BRPM | BODY MASS INDEX: 34.34 KG/M2 | SYSTOLIC BLOOD PRESSURE: 106 MMHG | OXYGEN SATURATION: 100 %

## 2020-01-15 DIAGNOSIS — J30.89 ENVIRONMENTAL AND SEASONAL ALLERGIES: Primary | ICD-10-CM

## 2020-01-15 DIAGNOSIS — L29.9 EAR ITCHING: ICD-10-CM

## 2020-01-15 NOTE — PROGRESS NOTES
Arnold Wick presents today for   Chief Complaint   Patient presents with   Ronnie Nash in Ear     right, sx started 2 weeks ago       Mir KONSTANTIN Mcfadden preferred language for health care discussion is english/other. Is someone accompanying this pt? no    Is the patient using any DME equipment during [address]? no    Depression Screening:  3 most recent PHQ Screens 1/15/2020   Little interest or pleasure in doing things Not at all   Feeling down, depressed, irritable, or hopeless Not at all   Total Score PHQ 2 0       Learning Assessment:  Learning Assessment 1/15/2020   PRIMARY LEARNER Patient   HIGHEST LEVEL OF EDUCATION - PRIMARY LEARNER  4 YEARS OF COLLEGE   BARRIERS PRIMARY LEARNER NONE   CO-LEARNER CAREGIVER No   PRIMARY LANGUAGE ENGLISH    NEED No   LEARNER PREFERENCE PRIMARY DEMONSTRATION   ANSWERED BY patient   RELATIONSHIP SELF       Abuse Screening:  Abuse Screening Questionnaire 1/15/2020   Do you ever feel afraid of your partner? N   Are you in a relationship with someone who physically or mentally threatens you? N   Is it safe for you to go home? Y       Generalized Anxiety  No flowsheet data found. Health Maintenance Due   Topic Date Due    DTaP/Tdap/Td series (1 - Tdap) 01/06/1996    Influenza Age 5 to Adult  08/01/2019   . Health Maintenance reviewed and discussed and ordered per Provider. Coordination of Care:  1. Have you been to the ER, urgent care clinic since your last visit? Hospitalized since your last visit? no    2. Have you seen or consulted any other health care providers outside of the [address] [place] [address] since your last visit? Include any pap smears or colon screening. no      Advance Directive:  1. Do you have an advance directive in place? Patient Reply:no    2. If not, would you like material regarding how to put one in place?  Patient Reply: no

## 2020-01-15 NOTE — PATIENT INSTRUCTIONS
Body Mass Index: Care Instructions Your Care Instructions Body mass index (BMI) can help you see if your weight is raising your risk for health problems. It uses a formula to compare how much you weigh with how tall you are. · A BMI lower than 18.5 is considered underweight. · A BMI between 18.5 and 24.9 is considered healthy. · A BMI between 25 and 29.9 is considered overweight. A BMI of 30 or higher is considered obese. If your BMI is in the normal range, it means that you have a lower risk for weight-related health problems. If your BMI is in the overweight or obese range, you may be at increased risk for weight-related health problems, such as high blood pressure, heart disease, stroke, arthritis or joint pain, and diabetes. If your BMI is in the underweight range, you may be at increased risk for health problems such as fatigue, lower protection (immunity) against illness, muscle loss, bone loss, hair loss, and hormone problems. BMI is just one measure of your risk for weight-related health problems. You may be at higher risk for health problems if you are not active, you eat an unhealthy diet, or you drink too much alcohol or use tobacco products. Follow-up care is a key part of your treatment and safety. Be sure to make and go to all appointments, and call your doctor if you are having problems. It's also a good idea to know your test results and keep a list of the medicines you take. How can you care for yourself at home? · Practice healthy eating habits. This includes eating plenty of fruits, vegetables, whole grains, lean protein, and low-fat dairy. · If your doctor recommends it, get more exercise. Walking is a good choice. Bit by bit, increase the amount you walk every day. Try for at least 30 minutes on most days of the week. · Do not smoke. Smoking can increase your risk for health problems.  If you need help quitting, talk to your doctor about stop-smoking programs and medicines. These can increase your chances of quitting for good. · Limit alcohol to 2 drinks a day for men and 1 drink a day for women. Too much alcohol can cause health problems. If you have a BMI higher than 25 · Your doctor may do other tests to check your risk for weight-related health problems. This may include measuring the distance around your waist. A waist measurement of more than 40 inches in men or 35 inches in women can increase the risk of weight-related health problems. · Talk with your doctor about steps you can take to stay healthy or improve your health. You may need to make lifestyle changes to lose weight and stay healthy, such as changing your diet and getting regular exercise. If you have a BMI lower than 18.5 · Your doctor may do other tests to check your risk for health problems. · Talk with your doctor about steps you can take to stay healthy or improve your health. You may need to make lifestyle changes to gain or maintain weight and stay healthy, such as getting more healthy foods in your diet and doing exercises to build muscle. Where can you learn more? Go to http://viraj-devora.info/. Enter S176 in the search box to learn more about \"Body Mass Index: Care Instructions. \" Current as of: October 13, 2016 Content Version: 11.4 © 0611-2838 [employer]. Care instructions adapted under license by [employer] (which disclaims liability or warranty for this information). If you have questions about a medical condition or this instruction, always ask your healthcare professional. [address] any warranty or liability for your use of this information.

## 2020-01-15 NOTE — LETTER
NOTIFICATION RETURN TO WORK / SCHOOL 
 
1/15/2020 2:35 PM 
 
Ms. Mir Mcfadden [address] [address] [address] [id number] To Whom It May Concern: Nico Sharma is currently under the care of Rosalino Arroyo. She will return to work/school on: 1/15/2020 If there are questions or concerns please have the patient contact our office.  
 
 
 
Sincerely, 
 
 
Keny Gray, NP

## 2020-01-15 NOTE — PROGRESS NOTES
ALONZO  Sangita Saldivar is a 28 y.o. female  Chief Complaint   Patient presents with    Ringing in Ear     right, sx started 2 weeks ago     Patient presents today for right sided ear pain. Reports the pain is more like a swishing. States that the pain began 2 weeks ago when she was having nasal drainage, congestion and sinus pressure. She states that the drainage and congestion have resolved but that she does still have sinus pressure and right sided ear pressure intermittently. Reports the ear canal did feel wet the other day but she did not she any drainage or discharge. Patient reports an intermittent dry cough mainly at night but denies and CP, SOB, fever chills, nausea, vomiting or diarrhea. Denies using anything OTC for her symptoms. Past Medical History  Past Medical History:   Diagnosis Date    Anemia     Fibroid, uterine 2013    GERD (gastroesophageal reflux disease)     History of blood product transfusion        Surgical History  Past Surgical History:   Procedure Laterality Date    HX CARPAL TUNNEL RELEASE      bilat    HX MYOMECTOMY  2013 and 2016    HX RIGHT SALPINGO-OOPHORECTOMY  2013    HX WISDOM TEETH EXTRACTION      at age 23        Medications  Current Outpatient Medications   Medication Sig Dispense Refill    multivit with iron,minerals (MULTI-VITAMINS WITH IRON PO) Take  by mouth.          Allergies  Allergies   Allergen Reactions    Macrobid [Nitrofurantoin Monohyd/M-Cryst] Nausea and Vomiting    Morphine Itching    Vicodin [Hydrocodone-Acetaminophen] Nausea and Vomiting and Other (comments)       Family History  Family History   Problem Relation Age of Onset    Hypertension Father     Diabetes Father     Diabetes Maternal Grandmother     Hypertension Maternal Grandmother     Arthritis-rheumatoid Maternal Grandfather     Diabetes Paternal Grandmother     Hypertension Paternal Grandmother     Arthritis-rheumatoid Paternal Grandfather     Thyroid Disease Sister     No Known Problems Brother        Social History  Social History     Socioeconomic History    Marital status:      Spouse name: Not on file    Number of children: Not on file    Years of education: Not on file    Highest education level: Not on file   Occupational History    Not on file   Social Needs    Financial resource strain: Not on file    Food insecurity:     Worry: Not on file     Inability: Not on file    Transportation needs:     Medical: Not on file     Non-medical: Not on file   Tobacco Use    Smoking status: Never Smoker    Smokeless tobacco: Never Used   Substance and Sexual Activity    Alcohol use:  Yes     Alcohol/week: 2.0 standard drinks     Types: 1 Glasses of wine, 1 Shots of liquor per week     Comment: socially, (not at the same time)    Drug use: No    Sexual activity: Yes     Partners: Male     Birth control/protection: None   Lifestyle    Physical activity:     Days per week: Not on file     Minutes per session: Not on file    Stress: Not on file   Relationships    Social connections:     Talks on phone: Not on file     Gets together: Not on file     Attends Confucianism service: Not on file     Active member of club or organization: Not on file     Attends meetings of clubs or organizations: Not on file     Relationship status: Not on file    Intimate partner violence:     Fear of current or ex partner: Not on file     Emotionally abused: Not on file     Physically abused: Not on file     Forced sexual activity: Not on file   Other Topics Concern    Not on file   Social History Narrative    Not on file       Problem List  Patient Active Problem List   Diagnosis Code    Gastroesophageal reflux disease K21.9    Obesity E66.9    Microcytic anemia D50.9    Preventive measure Z29.9    Iron deficiency anemia due to chronic blood loss D50.0    Menorrhagia N92.0    Severe obesity ([facility] [address] E66.01       Review of Systems  Review of Systems   Constitutional: Negative for chills and fever. HENT: Positive for ear pain (Right) and sinus pain. Respiratory: Positive for cough (At night. Non productive. ). Negative for shortness of breath. Cardiovascular: Negative for chest pain and palpitations. Gastrointestinal: Negative for constipation, diarrhea, nausea and vomiting. Vital Signs  Vitals:    01/15/20 1343   BP: 106/72   Pulse: 74   Resp: 16   Temp: 98.2 °F (36.8 °C)   TempSrc: Oral   SpO2: 100%   Weight: 193 lb 12.8 oz (87.9 kg)   Height: 5' 3\" (1.6 m)   PainSc:   0 - No pain   LMP: 01/03/2020       Physical Exam  Physical Exam  Constitutional:       Appearance: Normal appearance. HENT:      Right Ear: Tympanic membrane, ear canal and external ear normal.      Left Ear: Tympanic membrane, ear canal and external ear normal.      Mouth/Throat:      Mouth: Mucous membranes are moist.   Cardiovascular:      Rate and Rhythm: Normal rate and regular rhythm. Pulses: Normal pulses. Heart sounds: Normal heart sounds. Pulmonary:      Effort: Pulmonary effort is normal.      Breath sounds: Normal breath sounds. Musculoskeletal: Normal range of motion. Neurological:      Mental Status: She is alert and oriented to person, place, and time. Diagnostics  No orders of the defined types were placed in this encounter.       Results  Results for orders placed or performed in visit on 01/21/19   CBC W/O DIFF   Result Value Ref Range    WBC 5.5 3.4 - 10.8 x10E3/uL    RBC 3.98 3.77 - 5.28 x10E6/uL    HGB 9.7 (L) 11.1 - 15.9 g/dL    HCT 30.4 (L) 34.0 - 46.6 %    MCV 76 (L) 79 - 97 fL    MCH 24.4 (L) 26.6 - 33.0 pg    MCHC 31.9 31.5 - 35.7 g/dL    RDW 15.1 12.3 - 15.4 %    PLATELET 633 729 - 990 x10E3/uL   IRON PROFILE   Result Value Ref Range    TIBC 402 250 - 450 ug/dL    UIBC 276 131 - 425 ug/dL    Iron 126 27 - 159 ug/dL    Iron % saturation 31 15 - 55 %   FERRITIN   Result Value Ref Range    Ferritin 46 15 - 150 ng/mL   PAP IG, CT-NG-TV, APTIMA HPV AND RFX 48/98,68([id number])   Result Value Ref Range    Diagnosis Comment     Specimen adequacy Comment     Clinician provided ICD10 Comment     Performed by: Comment     . Kirill Levy Note: Comment     Test methodology Comment     HPV APTIMA Negative Negative    Chlamydia, Nuc. Acid Amp Negative Negative    Gonococcus, Nuc. Acid Amp Negative Negative    Trich vag by SHARA Negative Negative         Assessment and Plan  Diagnoses and all orders for this visit:    1. Environmental and seasonal allergies    2. Ear itching    3. BMI 34.0-34.9,adult      OTC Claritin daily for one month at night. After care summary printed and reviewed with patient. Plan reviewed with patient. Questions answered. Patient verbalized understanding of plan and is in agreement with plan. Patient to follow up as needed or earlier if symptoms worsen. Follow-up and Dispositions    · Return if symptoms worsen or fail to improve. WILLY Garza  Discussed the patient's BMI with her. The BMI follow up plan is as follows:     dietary management education, guidance, and counseling  encourage exercise  monitor weight  prescribed dietary intake    An After Visit Summary was printed and given to the patient.     WILLY Garza

## 2020-10-05 NOTE — PROGRESS NOTES
Hematology/Oncology Note    Name: Minal Michel  Date: 10/6/2020  : 1985    Cipriano Young NP     Ms. Mona Marshall is a 28y.o. year old female who was seen for Severe iron deficiency anemia     Venofer as needed. Last dose completed on 2018    Subjective:     Ms. Mona Marshall is a 29-year-old woman who had followed up with Dr. Ryan Wells previously for anemia. CBC on 2018 revealed a WBC count of 4.4, hemoglobin 9.7 g/dL, hematocrit 30.5%, and her platelet count was 130,253. A hemoglobin electrophoresis revealed normal adult hemoglobin. The iron profile revealed that she had an iron level of 18 mcg/dL with an iron saturation of only 4%. Her ferritin level was quite low at only 11 ng/mL. She was ordered for intravenous iron replenishment therapy with Venofer at a dose of 250 mg every 2 weeks for 4 complete doses. she has completed this on 2018 without any reactions noticed. She is in the office today for evaluation of iron deficiency anemia. She went to [facility] last month for severe anemia and received blood transfusion. She did not check iron profiles or ferritin at that time. She continue to have heavy menstrual periods which last for about 9 to 10-day with a large of blood clots. She has follow-up with her OB/GYN and plan for D/C next month. Her last menstrual day was on last week. She has taking iron pills twice a day. Today she reported chronic fatigue and low energy level with ice craving. The patient otherwise has no other complaints. Denied fever, chills, night sweat, unintentional weight loss, skin lumps or bumps, acute bleeding or bruising issues. No blood in stool, dark stool, melena, hematochezia, hemoptysis, dark urine, or easily bruising.   Denied headache, acute vision change, dizziness, chest pain, worsen shortness of breath, palpitation, productive cough, nausea, vomiting, abdominal pain, altered bowel habits, dysuria, new bone pain or back pain, focal numbness or weakness. Past Medical History:   Diagnosis Date    Anemia     Fibroid, uterine 2013    GERD (gastroesophageal reflux disease)     History of blood product transfusion      Past Surgical History:   Procedure Laterality Date    HX CARPAL TUNNEL RELEASE      bilat    HX MYOMECTOMY  2013 and 2016    HX RIGHT SALPINGO-OOPHORECTOMY  2013    HX WISDOM TEETH EXTRACTION      at age 23     Social History     Socioeconomic History    Marital status:      Spouse name: Not on file    Number of children: Not on file    Years of education: Not on file    Highest education level: Not on file   Occupational History    Not on file   Social Needs    Financial resource strain: Not on file    Food insecurity     Worry: Not on file     Inability: Not on file    Transportation needs     Medical: Not on file     Non-medical: Not on file   Tobacco Use    Smoking status: Never Smoker    Smokeless tobacco: Never Used   Substance and Sexual Activity    Alcohol use:  Yes     Alcohol/week: 2.0 standard drinks     Types: 1 Glasses of wine, 1 Shots of liquor per week     Comment: socially, (not at the same time)    Drug use: No    Sexual activity: Yes     Partners: Male     Birth control/protection: None   Lifestyle    Physical activity     Days per week: Not on file     Minutes per session: Not on file    Stress: Not on file   Relationships    Social connections     Talks on phone: Not on file     Gets together: Not on file     Attends Holiness service: Not on file     Active member of club or organization: Not on file     Attends meetings of clubs or organizations: Not on file     Relationship status: Not on file    Intimate partner violence     Fear of current or ex partner: Not on file     Emotionally abused: Not on file     Physically abused: Not on file     Forced sexual activity: Not on file   Other Topics Concern    Not on file   Social History Narrative    Not on file     Family History   Problem Relation Age of Onset    Hypertension Father     Diabetes Father     Diabetes Maternal Grandmother     Hypertension Maternal Grandmother     Arthritis-rheumatoid Maternal Grandfather     Diabetes Paternal Grandmother     Hypertension Paternal Grandmother    Carlo Langton Arthritis-rheumatoid Paternal Grandfather     Thyroid Disease Sister     No Known Problems Brother        Review of Systems   Constitutional: Positive for malaise/fatigue. Negative for chills, diaphoresis, fever and weight loss. Respiratory: Negative for cough, hemoptysis, shortness of breath and wheezing. Cardiovascular: Negative for chest pain, palpitations and leg swelling. Gastrointestinal: Negative for abdominal pain, diarrhea, heartburn, nausea and vomiting. Genitourinary: Negative for dysuria, frequency, hematuria and urgency. Musculoskeletal: Negative for joint pain and myalgias. Skin: Negative for itching and rash. Neurological: Negative for dizziness, seizures, weakness and headaches. Psychiatric/Behavioral: Negative for depression. The patient does not have insomnia. Objective:     Visit Vitals  /83 (BP Patient Position: Sitting)   Pulse 80   Temp 98.6 °F (37 °C) (Oral)   Resp 18   Wt 84.4 kg (186 lb)   SpO2 98%   BMI 32.95 kg/m²       ECOG Performance Status (grade): 0  0 - able to carry on all pre-disease activity w/out restriction  1 - restricted but able to carry out light work  2 - ambulatory and can self- care but unable to carry out work  3 - bed or chair >50% of waking hours  4 - completely disable, total care, confined to bed or chair    Physical Exam  Constitutional:       Appearance: Normal appearance. HENT:      Head: Normocephalic and atraumatic. Eyes:      Pupils: Pupils are equal, round, and reactive to light. Neck:      Musculoskeletal: Neck supple. Cardiovascular:      Rate and Rhythm: Normal rate and regular rhythm. Heart sounds: Normal heart sounds.    Pulmonary:      Effort: Pulmonary effort is normal.      Breath sounds: Normal breath sounds. Abdominal:      General: Bowel sounds are normal.      Palpations: Abdomen is soft. Tenderness: There is no abdominal tenderness. There is no guarding. Musculoskeletal: Normal range of motion. Right lower leg: No edema. Left lower leg: No edema. Skin:     General: Skin is warm. Neurological:      General: No focal deficit present. Mental Status: She is alert and oriented to person, place, and time. Mental status is at baseline. Diagnostics:      No results found for this or any previous visit (from the past 96 hour(s)). Imaging:  No results found for this or any previous visit. No results found for this or any previous visit. No results found for this or any previous visit. Assessment:     1. Microcytic anemia    2. Iron deficiency anemia due to chronic blood loss      Plan:   Microcytic anemia  Iron deficiency Anemia:   -- She had followed up with Dr. Ray Rudolph previously for anemia. CBC on 7/16/2018 revealed a WBC count of 4.4, hemoglobin 9.7 g/dL, hematocrit 30.5%, and her platelet count was 065,649. A hemoglobin electrophoresis revealed normal adult hemoglobin. The iron profile revealed that she had an iron level of 18 mcg/dL with an iron saturation of only 4%. Her ferritin level was quite low at only 11 ng/mL. She was ordered for intravenous iron replenishment therapy with Venofer at a dose of 250 mg every 2 weeks for 4 complete doses. She has completed this on 9/7/2018 without any reactions noticed. -- Hb electrophoresis showed normal adult hemoglobin present.   -- 9/7/2020 CBC showed H/H6.5/24, MCV 63, platelet 635 5K, WBC 7.8K. S.p blood transfusion at [facility]. She did not check iron profiles or ferritin at that time. -- She continue to have heavy menstrual periods which usually lasts for about 9 to 10-day monthly with a large of blood clots.   She has follow-up with her OB/GYN and plan for D/C next month. Her last menstrual was on last week. She has taking iron pills twice a day. -- We will check her CBC, CMP, Iron profile, ferritin today. -- We will go ahead to arrange for her IV iron Venofer. It can be changed after iron profiles and ferritin become available. The patient voiced understanding the potential risks and benefit of IV iron. She was agreeable with the plan. -- We will see the patient back in clinic in about 3 months. Always sooner if required. The patient can have repeat lab done prior to our next clinic visit. Orders Placed This Encounter    CBC WITH AUTOMATED DIFF     Standing Status:   Future     Standing Expiration Date:   49/5/0990    METABOLIC PANEL, COMPREHENSIVE     Standing Status:   Future     Standing Expiration Date:   10/7/2021    IRON PROFILE     Standing Status:   Future     Standing Expiration Date:   10/7/2021    FERRITIN     Standing Status:   Future     Standing Expiration Date:   10/7/2021           Ms. Aleida Bell has a reminder for a \"due or due soon\" health maintenance. I have asked that she contact her primary care provider for follow-up on this health maintenance. All of patient's questions answered to their apparent satisfaction. They verbally show understanding and agreement with aforementioned plan. Vandana Roque MD  10/6/2020          About 40 minutes were spent for this encounter with more than 50% of the time spent in face-to-face counseling, discussing on diagnosis and management plan going forward, and co-ordination of care. Parts of this document has been produced using Dragon dictation system. Unrecognized errors in transcription may be present. Please do not hesitate to reach out for any questions or clarifications.       CC: Carolyn Dial NP

## 2020-10-06 ENCOUNTER — OFFICE VISIT (OUTPATIENT)
Dept: ONCOLOGY | Age: 35
End: 2020-10-06
Payer: COMMERCIAL

## 2020-10-06 VITALS
RESPIRATION RATE: 18 BRPM | BODY MASS INDEX: 32.95 KG/M2 | WEIGHT: 186 LBS | OXYGEN SATURATION: 98 % | TEMPERATURE: 98.6 F | SYSTOLIC BLOOD PRESSURE: 122 MMHG | DIASTOLIC BLOOD PRESSURE: 83 MMHG | HEART RATE: 80 BPM

## 2020-10-06 DIAGNOSIS — D50.9 MICROCYTIC ANEMIA: Primary | ICD-10-CM

## 2020-10-06 DIAGNOSIS — D50.0 IRON DEFICIENCY ANEMIA DUE TO CHRONIC BLOOD LOSS: ICD-10-CM

## 2020-10-06 PROCEDURE — 99203 OFFICE O/P NEW LOW 30 MIN: CPT | Performed by: INTERNAL MEDICINE

## 2020-10-14 RX ORDER — HYDROCORTISONE SODIUM SUCCINATE 100 MG/2ML
100 INJECTION, POWDER, FOR SOLUTION INTRAMUSCULAR; INTRAVENOUS AS NEEDED
Status: CANCELLED | OUTPATIENT
Start: 2020-10-16

## 2020-10-14 RX ORDER — ACETAMINOPHEN 325 MG/1
650 TABLET ORAL AS NEEDED
Status: CANCELLED
Start: 2020-10-16

## 2020-10-14 RX ORDER — EPINEPHRINE 1 MG/ML
0.3 INJECTION, SOLUTION, CONCENTRATE INTRAVENOUS AS NEEDED
Status: CANCELLED | OUTPATIENT
Start: 2020-10-16

## 2020-10-14 RX ORDER — DIPHENHYDRAMINE HYDROCHLORIDE 50 MG/ML
25 INJECTION, SOLUTION INTRAMUSCULAR; INTRAVENOUS AS NEEDED
Status: CANCELLED
Start: 2020-10-16

## 2020-10-14 RX ORDER — SODIUM CHLORIDE 9 MG/ML
10 INJECTION INTRAMUSCULAR; INTRAVENOUS; SUBCUTANEOUS AS NEEDED
Status: CANCELLED | OUTPATIENT
Start: 2020-10-16

## 2020-10-14 RX ORDER — DIPHENHYDRAMINE HYDROCHLORIDE 50 MG/ML
50 INJECTION, SOLUTION INTRAMUSCULAR; INTRAVENOUS AS NEEDED
Status: CANCELLED
Start: 2020-10-16

## 2020-10-14 RX ORDER — HEPARIN 100 UNIT/ML
300-500 SYRINGE INTRAVENOUS AS NEEDED
Status: CANCELLED
Start: 2020-10-16

## 2020-10-14 RX ORDER — ONDANSETRON 2 MG/ML
8 INJECTION INTRAMUSCULAR; INTRAVENOUS AS NEEDED
Status: CANCELLED | OUTPATIENT
Start: 2020-10-16

## 2020-10-14 RX ORDER — ALBUTEROL SULFATE 0.83 MG/ML
2.5 SOLUTION RESPIRATORY (INHALATION) AS NEEDED
Status: CANCELLED
Start: 2020-10-16

## 2020-10-16 ENCOUNTER — HOSPITAL ENCOUNTER (OUTPATIENT)
Dept: INFUSION THERAPY | Age: 35
Discharge: HOME | End: 2020-10-16
Payer: COMMERCIAL

## 2020-10-16 VITALS
OXYGEN SATURATION: 99 % | WEIGHT: 182 LBS | DIASTOLIC BLOOD PRESSURE: 62 MMHG | BODY MASS INDEX: 32.25 KG/M2 | HEIGHT: 63 IN | HEART RATE: 65 BPM | RESPIRATION RATE: 16 BRPM | TEMPERATURE: 98.9 F | SYSTOLIC BLOOD PRESSURE: 96 MMHG

## 2020-10-16 DIAGNOSIS — D50.0 IRON DEFICIENCY ANEMIA DUE TO CHRONIC BLOOD LOSS: Primary | ICD-10-CM

## 2020-10-16 DIAGNOSIS — D50.0 IRON DEFICIENCY ANEMIA DUE TO CHRONIC BLOOD LOSS: ICD-10-CM

## 2020-10-16 PROCEDURE — 74011250636 HC RX REV CODE- 250/636: Performed by: INTERNAL MEDICINE

## 2020-10-16 PROCEDURE — 96366 THER/PROPH/DIAG IV INF ADDON: CPT

## 2020-10-16 PROCEDURE — 96365 THER/PROPH/DIAG IV INF INIT: CPT

## 2020-10-16 RX ORDER — ALBUTEROL SULFATE 0.83 MG/ML
2.5 SOLUTION RESPIRATORY (INHALATION) AS NEEDED
Status: CANCELLED
Start: 2020-10-29

## 2020-10-16 RX ORDER — HEPARIN 100 UNIT/ML
300-500 SYRINGE INTRAVENOUS AS NEEDED
Status: CANCELLED
Start: 2020-10-29

## 2020-10-16 RX ORDER — HYDROCORTISONE SODIUM SUCCINATE 100 MG/2ML
100 INJECTION, POWDER, FOR SOLUTION INTRAMUSCULAR; INTRAVENOUS AS NEEDED
Status: CANCELLED | OUTPATIENT
Start: 2020-10-29

## 2020-10-16 RX ORDER — DIPHENHYDRAMINE HYDROCHLORIDE 50 MG/ML
25 INJECTION, SOLUTION INTRAMUSCULAR; INTRAVENOUS AS NEEDED
Status: CANCELLED
Start: 2020-10-29

## 2020-10-16 RX ORDER — EPINEPHRINE 1 MG/ML
0.3 INJECTION, SOLUTION, CONCENTRATE INTRAVENOUS AS NEEDED
Status: CANCELLED | OUTPATIENT
Start: 2020-10-29

## 2020-10-16 RX ORDER — SODIUM CHLORIDE 0.9 % (FLUSH) 0.9 %
10 SYRINGE (ML) INJECTION AS NEEDED
Status: DISPENSED | OUTPATIENT
Start: 2020-10-16 | End: 2020-10-16

## 2020-10-16 RX ORDER — SODIUM CHLORIDE 0.9 % (FLUSH) 0.9 %
10 SYRINGE (ML) INJECTION AS NEEDED
Status: CANCELLED | OUTPATIENT
Start: 2020-10-29

## 2020-10-16 RX ORDER — ONDANSETRON 2 MG/ML
8 INJECTION INTRAMUSCULAR; INTRAVENOUS AS NEEDED
Status: CANCELLED | OUTPATIENT
Start: 2020-10-29

## 2020-10-16 RX ORDER — SODIUM CHLORIDE 9 MG/ML
25 INJECTION, SOLUTION INTRAVENOUS CONTINUOUS
Status: DISPENSED | OUTPATIENT
Start: 2020-10-16 | End: 2020-10-16

## 2020-10-16 RX ORDER — DIPHENHYDRAMINE HYDROCHLORIDE 50 MG/ML
50 INJECTION, SOLUTION INTRAMUSCULAR; INTRAVENOUS AS NEEDED
Status: CANCELLED
Start: 2020-10-29

## 2020-10-16 RX ORDER — ACETAMINOPHEN 325 MG/1
650 TABLET ORAL AS NEEDED
Status: CANCELLED
Start: 2020-10-29

## 2020-10-16 RX ORDER — SODIUM CHLORIDE 9 MG/ML
10 INJECTION INTRAMUSCULAR; INTRAVENOUS; SUBCUTANEOUS AS NEEDED
Status: CANCELLED | OUTPATIENT
Start: 2020-10-29

## 2020-10-16 RX ORDER — SODIUM CHLORIDE 9 MG/ML
25 INJECTION, SOLUTION INTRAVENOUS CONTINUOUS
Status: CANCELLED | OUTPATIENT
Start: 2020-10-29

## 2020-10-16 RX ADMIN — Medication 10 ML: at 08:40

## 2020-10-16 RX ADMIN — IRON SUCROSE 300 MG: 20 INJECTION, SOLUTION INTRAVENOUS at 08:45

## 2020-10-16 RX ADMIN — Medication 20 ML: at 10:30

## 2020-10-16 NOTE — PROGRESS NOTES
[facility] Progress Note    Date: 2020    Name: Oumou Burrell    MRN: [id number]         : 1985      Ms. Mcfadden arrived in the [facility] today at [address] [facility], in stable condition, here for Dose # 1 of 3, IV Venofer Infusion. She was assessed and education was provided. Ms. Sebastians vitals were reviewed. Visit Vitals  BP (!) 97/57 (BP 1 Location: Right arm, BP Patient Position: Sitting)   Pulse 72   Temp 98.8 °F (37.1 °C)   Resp 16   Ht 5' 2.5\" (1.588 m)   Wt 82.6 kg (182 lb)   SpO2 99%   Breastfeeding No   BMI 32.76 kg/m²         PIV # 25 G was established in her posterior right wrist at 0840, without incident, and brisk blood return was obtained. Venofer (Iron Sucrose) 300 mg IV, was administered over approximately 90 minutes, per order, and without. And then, after completion of the Venofer Infusion, Ms. Mcfadden was observed for 30 minutes, per protocol and also without incident. (Her PIV was flushed well with NS & then was clamped and left intact during the observation period.)      After completion of the observation period, the PIV was removed, and gauze/coban was applied. Ms. Robert Vale tolerated well, and had no complaints. Ms. Robert Vale was discharged from [place] [address] in stable condition at 1110. She is to return in 2 weeks, on Friday, 10-30-20 at 0800, for her next appointment, for Dose # 2 of 3, IV Venofer Infusion.      Carlos Trivedi RN  2020  8:31 AM

## 2020-10-29 DIAGNOSIS — D50.0 IRON DEFICIENCY ANEMIA DUE TO CHRONIC BLOOD LOSS: ICD-10-CM

## 2020-10-30 ENCOUNTER — HOSPITAL ENCOUNTER (OUTPATIENT)
Dept: INFUSION THERAPY | Age: 35
End: 2020-10-30

## 2020-11-03 ENCOUNTER — HOSPITAL ENCOUNTER (OUTPATIENT)
Dept: INFUSION THERAPY | Age: 35
Discharge: HOME | End: 2020-11-03
Payer: COMMERCIAL

## 2020-11-03 VITALS
OXYGEN SATURATION: 99 % | TEMPERATURE: 98.3 F | RESPIRATION RATE: 16 BRPM | DIASTOLIC BLOOD PRESSURE: 63 MMHG | SYSTOLIC BLOOD PRESSURE: 100 MMHG | HEART RATE: 73 BPM

## 2020-11-03 DIAGNOSIS — D50.0 IRON DEFICIENCY ANEMIA DUE TO CHRONIC BLOOD LOSS: Primary | ICD-10-CM

## 2020-11-03 PROCEDURE — 96366 THER/PROPH/DIAG IV INF ADDON: CPT

## 2020-11-03 PROCEDURE — 74011250636 HC RX REV CODE- 250/636: Performed by: INTERNAL MEDICINE

## 2020-11-03 PROCEDURE — 96365 THER/PROPH/DIAG IV INF INIT: CPT

## 2020-11-03 RX ORDER — ACETAMINOPHEN 325 MG/1
650 TABLET ORAL AS NEEDED
Status: CANCELLED
Start: 2020-11-05

## 2020-11-03 RX ORDER — SODIUM CHLORIDE 0.9 % (FLUSH) 0.9 %
10 SYRINGE (ML) INJECTION AS NEEDED
Status: DISPENSED | OUTPATIENT
Start: 2020-11-03 | End: 2020-11-03

## 2020-11-03 RX ORDER — SODIUM CHLORIDE 9 MG/ML
25 INJECTION, SOLUTION INTRAVENOUS CONTINUOUS
Status: CANCELLED | OUTPATIENT
Start: 2020-11-05

## 2020-11-03 RX ORDER — HEPARIN 100 UNIT/ML
300-500 SYRINGE INTRAVENOUS AS NEEDED
Status: CANCELLED
Start: 2020-11-05

## 2020-11-03 RX ORDER — ONDANSETRON 2 MG/ML
8 INJECTION INTRAMUSCULAR; INTRAVENOUS AS NEEDED
Status: CANCELLED | OUTPATIENT
Start: 2020-11-05

## 2020-11-03 RX ORDER — ALBUTEROL SULFATE 0.83 MG/ML
2.5 SOLUTION RESPIRATORY (INHALATION) AS NEEDED
Status: CANCELLED
Start: 2020-11-05

## 2020-11-03 RX ORDER — SODIUM CHLORIDE 9 MG/ML
10 INJECTION INTRAMUSCULAR; INTRAVENOUS; SUBCUTANEOUS AS NEEDED
Status: CANCELLED | OUTPATIENT
Start: 2020-11-05

## 2020-11-03 RX ORDER — HYDROCORTISONE SODIUM SUCCINATE 100 MG/2ML
100 INJECTION, POWDER, FOR SOLUTION INTRAMUSCULAR; INTRAVENOUS AS NEEDED
Status: CANCELLED | OUTPATIENT
Start: 2020-11-05

## 2020-11-03 RX ORDER — EPINEPHRINE 1 MG/ML
0.3 INJECTION, SOLUTION, CONCENTRATE INTRAVENOUS AS NEEDED
Status: CANCELLED | OUTPATIENT
Start: 2020-11-05

## 2020-11-03 RX ORDER — SODIUM CHLORIDE 9 MG/ML
25 INJECTION, SOLUTION INTRAVENOUS CONTINUOUS
Status: DISPENSED | OUTPATIENT
Start: 2020-11-03 | End: 2020-11-03

## 2020-11-03 RX ORDER — SODIUM CHLORIDE 0.9 % (FLUSH) 0.9 %
10 SYRINGE (ML) INJECTION AS NEEDED
Status: CANCELLED | OUTPATIENT
Start: 2020-11-05

## 2020-11-03 RX ORDER — DIPHENHYDRAMINE HYDROCHLORIDE 50 MG/ML
50 INJECTION, SOLUTION INTRAMUSCULAR; INTRAVENOUS AS NEEDED
Status: CANCELLED
Start: 2020-11-05

## 2020-11-03 RX ORDER — DIPHENHYDRAMINE HYDROCHLORIDE 50 MG/ML
25 INJECTION, SOLUTION INTRAMUSCULAR; INTRAVENOUS AS NEEDED
Status: CANCELLED
Start: 2020-11-05

## 2020-11-03 RX ADMIN — Medication 20 ML: at 10:10

## 2020-11-03 RX ADMIN — Medication 10 ML: at 08:25

## 2020-11-03 RX ADMIN — SODIUM CHLORIDE 300 MG: 900 INJECTION, SOLUTION INTRAVENOUS at 08:30

## 2020-11-03 NOTE — PROGRESS NOTES
[facility] Progress Note    Date: November 3, 2020    Name: Luba Medina    MRN: [id number]         : 1985      Ms. Mcfadden arrived in the [facility] today at 0810, in stable condition, here for Dose # 2 of 3, IV Venofer Infusion (Every 14 days X 3 Doses). She was assessed and education was provided. Ms. Sebastians vitals were reviewed. Visit Vitals  /64 (BP 1 Location: Right arm, BP Patient Position: Sitting)   Pulse 71   Temp 98.8 °F (37.1 °C)   Resp 16   SpO2 99%   Breastfeeding No         PIV # 24 G was established in her posterior right hand at 0825, without incident, and brisk blood return was obtained. Venofer (Iron Sucrose) 300 mg IV, was administered over approximately 90 minutes, per order, and without. And then, after completion of the Venofer Infusion, Ms. Mcfadden was observed for 30 minutes, per protocol and also without incident. (Her PIV was flushed well with NS & then was clamped and left intact during the observation period.)      After completion of the observation period, the PIV was removed, and gauze/coban was applied. Ms. Magnolia Roldan tolerated well, and had no complaints. Ms. Magnolia Roldan was discharged from [place] [address] in stable condition at 1050. [facility] She is to return in 2 weeks, on Tuesday, 20 at 0800, for her next appointment, for Dose # 3 of 3, IV Venofer Infusion.      Ronald Ayala RN  November 3, 2020  8:31 AM

## 2020-11-13 ENCOUNTER — APPOINTMENT (OUTPATIENT)
Dept: INFUSION THERAPY | Age: 35
End: 2020-11-13
Payer: COMMERCIAL

## 2020-11-13 DIAGNOSIS — D50.0 IRON DEFICIENCY ANEMIA DUE TO CHRONIC BLOOD LOSS: ICD-10-CM

## 2020-11-17 ENCOUNTER — HOSPITAL ENCOUNTER (OUTPATIENT)
Dept: INFUSION THERAPY | Age: 35
End: 2020-11-17

## 2020-11-20 ENCOUNTER — HOSPITAL ENCOUNTER (OUTPATIENT)
Dept: INFUSION THERAPY | Age: 35
Discharge: HOME | End: 2020-11-20
Payer: COMMERCIAL

## 2020-11-20 VITALS
DIASTOLIC BLOOD PRESSURE: 67 MMHG | OXYGEN SATURATION: 96 % | TEMPERATURE: 98.7 F | RESPIRATION RATE: 18 BRPM | SYSTOLIC BLOOD PRESSURE: 102 MMHG | HEART RATE: 67 BPM

## 2020-11-20 DIAGNOSIS — D50.0 IRON DEFICIENCY ANEMIA DUE TO CHRONIC BLOOD LOSS: Primary | ICD-10-CM

## 2020-11-20 PROCEDURE — 74011250636 HC RX REV CODE- 250/636: Performed by: INTERNAL MEDICINE

## 2020-11-20 PROCEDURE — 96365 THER/PROPH/DIAG IV INF INIT: CPT

## 2020-11-20 PROCEDURE — 96366 THER/PROPH/DIAG IV INF ADDON: CPT

## 2020-11-20 RX ORDER — HEPARIN 100 UNIT/ML
300-500 SYRINGE INTRAVENOUS AS NEEDED
Status: CANCELLED
Start: 2020-11-20

## 2020-11-20 RX ORDER — SODIUM CHLORIDE 9 MG/ML
25 INJECTION, SOLUTION INTRAVENOUS CONTINUOUS
Status: DISPENSED | OUTPATIENT
Start: 2020-11-20 | End: 2020-11-20

## 2020-11-20 RX ORDER — SODIUM CHLORIDE 0.9 % (FLUSH) 0.9 %
10 SYRINGE (ML) INJECTION AS NEEDED
Status: CANCELLED | OUTPATIENT
Start: 2020-11-20

## 2020-11-20 RX ORDER — SODIUM CHLORIDE 9 MG/ML
10 INJECTION INTRAMUSCULAR; INTRAVENOUS; SUBCUTANEOUS AS NEEDED
Status: CANCELLED | OUTPATIENT
Start: 2020-11-20

## 2020-11-20 RX ORDER — ALBUTEROL SULFATE 0.83 MG/ML
2.5 SOLUTION RESPIRATORY (INHALATION) AS NEEDED
Status: CANCELLED
Start: 2020-11-20

## 2020-11-20 RX ORDER — SODIUM CHLORIDE 9 MG/ML
25 INJECTION, SOLUTION INTRAVENOUS CONTINUOUS
Status: CANCELLED | OUTPATIENT
Start: 2020-11-20

## 2020-11-20 RX ORDER — DIPHENHYDRAMINE HYDROCHLORIDE 50 MG/ML
25 INJECTION, SOLUTION INTRAMUSCULAR; INTRAVENOUS AS NEEDED
Status: CANCELLED
Start: 2020-11-20

## 2020-11-20 RX ORDER — EPINEPHRINE 1 MG/ML
0.3 INJECTION, SOLUTION, CONCENTRATE INTRAVENOUS AS NEEDED
Status: CANCELLED | OUTPATIENT
Start: 2020-11-20

## 2020-11-20 RX ORDER — ACETAMINOPHEN 325 MG/1
650 TABLET ORAL AS NEEDED
Status: CANCELLED
Start: 2020-11-20

## 2020-11-20 RX ORDER — DIPHENHYDRAMINE HYDROCHLORIDE 50 MG/ML
50 INJECTION, SOLUTION INTRAMUSCULAR; INTRAVENOUS AS NEEDED
Status: CANCELLED
Start: 2020-11-20

## 2020-11-20 RX ORDER — HYDROCORTISONE SODIUM SUCCINATE 100 MG/2ML
100 INJECTION, POWDER, FOR SOLUTION INTRAMUSCULAR; INTRAVENOUS AS NEEDED
Status: CANCELLED | OUTPATIENT
Start: 2020-11-20

## 2020-11-20 RX ORDER — ONDANSETRON 2 MG/ML
8 INJECTION INTRAMUSCULAR; INTRAVENOUS AS NEEDED
Status: CANCELLED | OUTPATIENT
Start: 2020-11-20

## 2020-11-20 RX ADMIN — IRON SUCROSE 400 MG: 20 INJECTION, SOLUTION INTRAVENOUS at 08:34

## 2020-11-20 NOTE — PROGRESS NOTES
[facility] Progress Note    Date: 2020    Name: Connie Lehman    MRN: [id number]         : 1985      Ms. Mcfadden arrived in the [facility] today at [address], in stable condition, here for Dose # 3 of 3, IV Venofer Infusion (Every 14 days X 3 Doses). She was assessed and education was provided. Ms. Mcfadden's vitals were reviewed. Visit Vitals  /68 (BP 1 Location: Right arm, BP Patient Position: Sitting)   Pulse 78   Temp 98.7 °F (37.1 °C)   Resp 18   SpO2 96%         PIV # 24 G x 2 attempts to left AC, successful by Melanie Garcia RN. Brisk blood return, flushes well. Venofer (Iron Sucrose) 400mg infused over 150 minutes as ordered. PIV flushed with NS and removed, cath intact. Gauze/coban to site. Ms. Janis Morel tolerated well, and had no complaints. Ms. Janis Morel was discharged from [place] [address] in stable condition at 1130. She has no further appointments scheduled with \[facility]\"" at this time.        Jarett Yeung RN  2020

## 2020-12-28 ENCOUNTER — DOCUMENTATION ONLY (OUTPATIENT)
Dept: ONCOLOGY | Age: 35
End: 2020-12-28

## 2020-12-28 NOTE — PROGRESS NOTES
Pt cx lab appt for 12/28/2020 through reminder service. Lvm 12/24/2020 & 12/28/2020 to call office to r/s.

## 2021-03-12 LAB — SARS-COV-2, NAA: NOT DETECTED

## 2021-04-29 ENCOUNTER — VIRTUAL VISIT (OUTPATIENT)
Dept: FAMILY MEDICINE CLINIC | Age: 36
End: 2021-04-29
Payer: COMMERCIAL

## 2021-04-29 DIAGNOSIS — J02.9 SORE THROAT: Primary | ICD-10-CM

## 2021-04-29 PROBLEM — E66.01 SEVERE OBESITY (HCC): Status: RESOLVED | Noted: 2019-01-21 | Resolved: 2021-04-29

## 2021-04-29 PROCEDURE — 99213 OFFICE O/P EST LOW 20 MIN: CPT | Performed by: NURSE PRACTITIONER

## 2021-04-29 RX ORDER — AZITHROMYCIN 250 MG/1
TABLET, FILM COATED ORAL
Qty: 6 TAB | Refills: 0 | Status: SHIPPED | OUTPATIENT
Start: 2021-04-29 | End: 2022-10-10 | Stop reason: ALTCHOICE

## 2021-04-29 RX ORDER — PREDNISONE 20 MG/1
TABLET ORAL
Qty: 24 TAB | Refills: 0 | Status: SHIPPED | OUTPATIENT
Start: 2021-04-29 | End: 2022-10-10 | Stop reason: ALTCHOICE

## 2021-04-29 NOTE — PROGRESS NOTES
Helen Marroquin presents today for   Chief Complaint   Patient presents with    Sore Throat     swollen over a week, negative for COVID and strep throat. 1st Covid Vaccine 4/18 afterwards symptoms started        Virtual/telephone visit    Depression Screening:  3 most recent PHQ Screens 4/29/2021   Little interest or pleasure in doing things Not at all   Feeling down, depressed, irritable, or hopeless Not at all   Total Score PHQ 2 0       Learning Assessment:  Learning Assessment 1/15/2020   PRIMARY LEARNER Patient   HIGHEST LEVEL OF EDUCATION - PRIMARY LEARNER  4 YEARS OF COLLEGE   BARRIERS PRIMARY LEARNER NONE   CO-LEARNER CAREGIVER No   PRIMARY LANGUAGE ENGLISH    NEED No   LEARNER PREFERENCE PRIMARY DEMONSTRATION   ANSWERED BY patient   RELATIONSHIP SELF       Fall Risk  No flowsheet data found. ADL  No flowsheet data found. Travel Screening:    Travel Screening     Question   Response    In the last month, have you been in contact with someone who was confirmed or suspected to have Coronavirus / COVID-19? No / Unsure    Have you had a COVID-19 viral test in the last 14 days? No    Do you have any of the following new or worsening symptoms? Have you traveled internationally or domestically in the last month? No      Travel History   Travel since 03/29/21     No documented travel since 03/29/21          Health Maintenance reviewed and discussed and ordered per Provider. Health Maintenance Due   Topic Date Due    Hepatitis C Screening  Never done    COVID-19 Vaccine (1) Never done    DTaP/Tdap/Td series (1 - Tdap) Never done   . Coordination of Care:    1. Have you been to the ER, urgent care clinic since your last visit? Hospitalized since your last visit? [facility] swelling and hoarseness    2. Have you seen or consulted any other health care providers outside of the [facility] [place] [address] since your last visit?  Include any pap smears or colon screening.  No

## 2021-04-29 NOTE — PROGRESS NOTES
Gumaro Mcfadden is a 39 y.o. female who was seen by synchronous (real-time) audio-video technology on 4/29/2021 for Sore Throat (swollen over a week, negative for COVID and strep throat. 1st Covid Vaccine 4/18 afterwards symptoms started )    Assessment & Plan:     1. Sore throat  Comments:  Start abx, given length of time of sxs  Given another round of steroids  Discussed red flag symptoms, such as hives, sob, facial swelling  Keep appt with ENT  Orders:  -     azithromycin (ZITHROMAX) 250 mg tablet; Take 2 tablets today, then take 1 tablet daily, Normal, Disp-6 Tab, R-0  -     predniSONE (DELTASONE) 20 mg tablet; Take 3 tablets by mouth once a day for 4 days, then 2 tablets by mouth once a day for 4 days, then 1 tablet by mouth once a day for 4 days then stop., Normal, Disp-24 Tab, R-0     Follow-up and Dispositions    · Return if symptoms worsen or fail to improve, for sore throat. SUBJECTIVE:     HPI    Sore Throat-  Onset: 1 week ago  Characteristics:  Associated symptoms: swollen glands  Received first dose of her Moderna vaccine on 04/18/2021  The day after, she developed fever, chills, body aches, lasted about 4 days  Her sinuses then started bothering her and throat started to hurt  She attributed her symptoms to sinusitis, and started taking Mucinex D and Tylenol  Neck glands \"very swollen\"  Went to [facility], was tested for strep and COVID on 04/18/2021  She was told this may be sinusitis, was given Sudafed and medrol Anton   She states that the medications she was given did not help  Symptoms worst at night, cannot swallow or breathe with lying down  Hoarseness developed yesterday  Second dose of Rico Morelle scheduled on 05/15/2021 at [facility] in [address] [place] [address]    Review of Systems   Constitutional: Negative for chills, fever and malaise/fatigue. HENT: Positive for congestion and sore throat. Respiratory: Negative for shortness of breath. Cardiovascular: Negative for chest pain.    Gastrointestinal: Negative for diarrhea, nausea and vomiting. Musculoskeletal: Negative for myalgias. Neurological: Negative for dizziness, tingling and headaches. OBJECTIVE:     Physical Exam   Constitutional: Stable-appearing, in no distress, alert and oriented  HENT:   Head: Normocephalic and atraumatic. Ears:  Hearing grossly intact. Mouth:  No visible perioral lesions, cyanosis, or lip swelling. Pulmonary/Chest: Does not appear dyspneic, no audible wheezes or nasal flaring. Musculoskeletal: Grossly normal active ROM in upper extremities. Neurological:  Intact recent memory, no facial or eyelid drooping, no speech impairment, answering questions appropriately. Psychiatric: Judgment and insight good, normal mood and affect. We discussed the expected course, resolution and complications of the diagnosis(es) in detail. Medication risks, benefits, costs, interactions, and alternatives were discussed as indicated. I advised her to contact the office if her condition worsens, changes or fails to improve as anticipated. She expressed understanding with the diagnosis(es) and plan. Mir Mcfadden, who was evaluated through a synchronous (real-time) audio-video encounter, and/or her healthcare decision maker, is aware that it is a billable service, with coverage as determined by her insurance carrier. provided verbal consent to proceed: Yes, and patient identification was verified. It was conducted pursuant to the emergency declaration under the [address] [place] [address], [address] [place] [address] authority and the [employer] Act. A caregiver was present when appropriate. Ability to conduct physical exam was limited. I was in the office. The patient was at home.     WILLY Guzman

## 2022-03-18 PROBLEM — D50.0 IRON DEFICIENCY ANEMIA DUE TO CHRONIC BLOOD LOSS: Status: ACTIVE | Noted: 2018-07-16

## 2022-03-19 PROBLEM — K21.9 GASTROESOPHAGEAL REFLUX DISEASE: Status: ACTIVE | Noted: 2017-08-17

## 2022-03-19 PROBLEM — Z29.9 PREVENTIVE MEASURE: Status: ACTIVE | Noted: 2017-08-17

## 2022-03-19 PROBLEM — D50.9 MICROCYTIC ANEMIA: Status: ACTIVE | Noted: 2017-08-17

## 2022-03-19 PROBLEM — N92.0 MENORRHAGIA: Status: ACTIVE | Noted: 2018-07-16

## 2022-03-20 PROBLEM — E66.9 OBESITY: Status: ACTIVE | Noted: 2017-08-17

## 2022-10-10 ENCOUNTER — OFFICE VISIT (OUTPATIENT)
Dept: FAMILY MEDICINE CLINIC | Age: 37
End: 2022-10-10
Payer: COMMERCIAL

## 2022-10-10 VITALS
SYSTOLIC BLOOD PRESSURE: 115 MMHG | RESPIRATION RATE: 20 BRPM | TEMPERATURE: 97.8 F | HEIGHT: 63 IN | HEART RATE: 69 BPM | DIASTOLIC BLOOD PRESSURE: 75 MMHG | WEIGHT: 200.8 LBS | BODY MASS INDEX: 35.58 KG/M2 | OXYGEN SATURATION: 98 %

## 2022-10-10 DIAGNOSIS — Z13.220 SCREENING FOR LIPID DISORDERS: ICD-10-CM

## 2022-10-10 DIAGNOSIS — E66.9 OBESITY (BMI 35.0-39.9 WITHOUT COMORBIDITY): ICD-10-CM

## 2022-10-10 DIAGNOSIS — Z23 NEEDS FLU SHOT: ICD-10-CM

## 2022-10-10 DIAGNOSIS — D50.9 MICROCYTIC ANEMIA: ICD-10-CM

## 2022-10-10 DIAGNOSIS — Z00.00 ANNUAL PHYSICAL EXAM: Primary | ICD-10-CM

## 2022-10-10 DIAGNOSIS — Z11.59 NEED FOR HEPATITIS C SCREENING TEST: ICD-10-CM

## 2022-10-10 DIAGNOSIS — J30.1 SEASONAL ALLERGIC RHINITIS DUE TO POLLEN: ICD-10-CM

## 2022-10-10 PROBLEM — Z29.9 PREVENTIVE MEASURE: Status: RESOLVED | Noted: 2017-08-17 | Resolved: 2022-10-10

## 2022-10-10 PROCEDURE — 99395 PREV VISIT EST AGE 18-39: CPT

## 2022-10-10 PROCEDURE — 90686 IIV4 VACC NO PRSV 0.5 ML IM: CPT

## 2022-10-10 PROCEDURE — 90471 IMMUNIZATION ADMIN: CPT

## 2022-10-10 RX ORDER — CETIRIZINE HCL 10 MG
10 TABLET ORAL DAILY
COMMUNITY

## 2022-10-10 RX ORDER — MELATONIN
1000 AS NEEDED
COMMUNITY

## 2022-10-10 NOTE — PROGRESS NOTES
Tona Georges presents today for   Chief Complaint   Patient presents with    Complete Physical     No pap       Altreese KONSTANTIN Mcfadden preferred language for health care discussion is english/other. Is someone accompanying this pt? no    Is the patient using any DME equipment during [address]? no    Depression Screening:  3 most recent PHQ Screens 10/10/2022   Little interest or pleasure in doing things Not at all   Feeling down, depressed, irritable, or hopeless Not at all   Total Score PHQ 2 0       Learning Assessment:  Learning Assessment 10/10/2022   PRIMARY LEARNER Patient   HIGHEST LEVEL OF EDUCATION - PRIMARY LEARNER  -   BARRIERS PRIMARY LEARNER -   [address] [facility]    NEED -   LEARNER PREFERENCE PRIMARY DEMONSTRATION   ANSWERED BY patient   RELATIONSHIP SELF       Abuse Screening:  Abuse Screening Questionnaire 10/10/2022   Do you ever feel afraid of your partner? N   Are you in a relationship with someone who physically or mentally threatens you? N   Is it safe for you to go home? Y       Generalized Anxiety  No flowsheet data found. Health Maintenance Due   Topic Date Due    Hepatitis C Screening  Never done    COVID-19 Vaccine (1) Never done    DTaP/Tdap/Td series (1 - Tdap) Never done    Depression Screen  04/29/2022    Flu Vaccine (1) 08/01/2022   . Health Maintenance reviewed and discussed and ordered per Provider. Coordination of Care:  1. Have you been to the ER, urgent care clinic since your last visit? Hospitalized since your last visit? Yes, urgent care    2. Have you seen or consulted any other health care providers outside of the [address] [place] [address] since your last visit? Include any pap smears or colon screening. no      Advance Directive:  1. Do you have an advance directive in place?  Patient Reply:no

## 2022-10-10 NOTE — PROGRESS NOTES
Stone Teresa is a 40 y.o. female is seen on 10/10/2022 for Complete Physical (No pap)    Assessment & Plan:     1. Annual physical exam  -     METABOLIC PANEL, COMPREHENSIVE; Future  2. Microcytic anemia  Assessment & Plan:  Continue OTC iron pills, recheck cbc  If decreased, may start on ferrous sulfate PO  Orders:  -     CBC WITH AUTOMATED DIFF; Future  3. Seasonal allergic rhinitis due to pollen  Assessment & Plan:   Stable, continue current regimen  4. Screening for lipid disorders  -     LIPID PANEL; Future  5. Need for hepatitis C screening test  -     HEPATITIS C AB; Future  6. Needs flu shot  -     INFLUENZA, FLUARIX, FLULAVAL, FLUZONE (AGE 6 MO+), AFLURIA(AGE 3Y+) IM, PF, 0.5 ML     4 weeks for lab results (CBC, CMP, lipid panel)  Subjective:     HPI    Social Hx:  Occupation-   Household-mother and father  ETOH use -social  Caffeine use-none  Recreational drug use-none  Tobacco use -none    Family Hx:  HTN-father, both maternal and paternal grandparents  Diabetes- father, MGM, PGM  HLD- father, MGM, PGM  MI-none  Stroke-none  Cancer-MGF  Mental Health Disorder-mother, MGM (depression, anxiety)  Autoimmune Disorder-none    Preventive:  Diet: eats salads, all meats, all vegetables, occasional fruit  Exercise: 30 minutes every day  Last eye exam- November 2021  Last dental exam- May 2021  Last PAP- 2018  COVID-19 vac - received 3 doses  Flu vaccine- ordered today    Health Concerns:    Microcytic Anemia -   Current symptoms: eating ice, heavy periods  Risk factors:hx of microcytic anemia  Treatment: occasionally takes OTC iron pills during heavy periods    Allergic rhinitis  Started to take Zyrtec this year  Has seasonal allergies    Review of Systems   Constitutional:  Negative for chills, fever, malaise/fatigue and weight loss. HENT:  Negative for congestion, ear pain and sore throat. Eyes:  Negative for blurred vision, pain and discharge.    Respiratory:  Negative for cough and shortness of breath. Cardiovascular:  Negative for chest pain, palpitations, orthopnea and leg swelling. Gastrointestinal:  Negative for abdominal pain, blood in stool, diarrhea, nausea and vomiting. Genitourinary:  Negative for dysuria, frequency, hematuria and urgency. Musculoskeletal:  Negative for back pain, joint pain and myalgias. Skin:  Negative for rash. Neurological:  Negative for dizziness (reports intermittent menorrhagia), tingling, weakness and headaches. Endo/Heme/Allergies:  Does not bruise/bleed easily. Psychiatric/Behavioral:  Negative for depression. The patient is not nervous/anxious and does not have insomnia. Objective:   /75 (BP 1 Location: Left upper arm, BP Patient Position: Sitting, BP Cuff Size: Large adult)   Pulse 69   Temp 97.8 °F (36.6 °C) (Oral)   Resp 20   Ht 5' 2.5\" (1.588 m)   Wt 200 lb 12.8 oz (91.1 kg)   LMP 09/15/2022 (Exact Date)   SpO2 98%   BMI 36.14 kg/m²   Vision Screening    Right eye Left eye Both eyes   Without correction      With correction 20/25 20/20 20/20     Physical Exam  Vitals and nursing note reviewed. Constitutional:       General: She is not in acute distress. Appearance: Normal appearance. She is not ill-appearing or toxic-appearing. HENT:      Head: Normocephalic and atraumatic. Right Ear: Tympanic membrane, ear canal and external ear normal.      Left Ear: Tympanic membrane, ear canal and external ear normal.      Mouth/Throat:      Mouth: Mucous membranes are moist.      Pharynx: Oropharynx is clear. No oropharyngeal exudate or posterior oropharyngeal erythema. Eyes:      Extraocular Movements: Extraocular movements intact. Conjunctiva/sclera: Conjunctivae normal.      Pupils: Pupils are equal, round, and reactive to light. Neck:      Vascular: No carotid bruit. Cardiovascular:      Rate and Rhythm: Normal rate and regular rhythm. Pulses: Normal pulses. Heart sounds: Normal heart sounds. No murmur heard. No friction rub. No gallop. Pulmonary:      Effort: Pulmonary effort is normal. No respiratory distress. Breath sounds: No wheezing, rhonchi or rales. Abdominal:      General: Abdomen is flat. Bowel sounds are normal. There is no distension. Palpations: Abdomen is soft. There is no mass. Tenderness: There is no abdominal tenderness. There is no right CVA tenderness, left CVA tenderness, guarding or rebound. Musculoskeletal:         General: No tenderness or deformity. Normal range of motion. Cervical back: Normal range of motion and neck supple. Lymphadenopathy:      Cervical: No cervical adenopathy. Skin:     General: Skin is warm and dry. Capillary Refill: Capillary refill takes less than 2 seconds. Neurological:      General: No focal deficit present. Mental Status: She is alert and oriented to person, place, and time. Sensory: No sensory deficit. Motor: No weakness. Coordination: Coordination normal.   Psychiatric:         Mood and Affect: Mood normal.         Thought Content:  Thought content normal.         Judgment: Judgment normal.      CINDA ChaudhryC

## 2022-11-08 ENCOUNTER — HOSPITAL ENCOUNTER (OUTPATIENT)
Dept: LAB | Age: 37
Discharge: HOME | End: 2022-11-08
Payer: COMMERCIAL

## 2022-11-08 DIAGNOSIS — Z13.220 SCREENING FOR LIPID DISORDERS: ICD-10-CM

## 2022-11-08 DIAGNOSIS — Z00.00 ANNUAL PHYSICAL EXAM: ICD-10-CM

## 2022-11-08 DIAGNOSIS — D50.9 MICROCYTIC ANEMIA: ICD-10-CM

## 2022-11-08 DIAGNOSIS — Z11.59 NEED FOR HEPATITIS C SCREENING TEST: ICD-10-CM

## 2022-11-08 LAB
ALBUMIN SERPL-MCNC: 3.6 G/DL (ref 3.4–5)
ALBUMIN/GLOB SERPL: 1.1 {RATIO} (ref 0.8–1.7)
ALP SERPL-CCNC: 62 U/L (ref 45–117)
ALT SERPL-CCNC: 21 U/L (ref 13–56)
ANION GAP SERPL CALC-SCNC: 4 MMOL/L (ref 3–18)
AST SERPL-CCNC: 13 U/L (ref 10–38)
BASOPHILS # BLD: 0 K/UL (ref 0–0.1)
BASOPHILS NFR BLD: 1 % (ref 0–2)
BILIRUB SERPL-MCNC: 0.6 MG/DL (ref 0.2–1)
BUN SERPL-MCNC: 11 MG/DL (ref 7–18)
BUN/CREAT SERPL: 16 (ref 12–20)
CALCIUM SERPL-MCNC: 8.9 MG/DL (ref 8.5–10.1)
CHLORIDE SERPL-SCNC: 110 MMOL/L (ref 100–111)
CHOLEST SERPL-MCNC: 161 MG/DL
CO2 SERPL-SCNC: 26 MMOL/L (ref 21–32)
CREAT SERPL-MCNC: 0.68 MG/DL (ref 0.6–1.3)
DIFFERENTIAL METHOD BLD: ABNORMAL
EOSINOPHIL # BLD: 0.3 K/UL (ref 0–0.4)
EOSINOPHIL NFR BLD: 8 % (ref 0–5)
ERYTHROCYTE [DISTWIDTH] IN BLOOD BY AUTOMATED COUNT: 20.8 % (ref 11.6–14.5)
GLOBULIN SER CALC-MCNC: 3.2 G/DL (ref 2–4)
GLUCOSE SERPL-MCNC: 84 MG/DL (ref 74–99)
HCT VFR BLD AUTO: 28.5 % (ref 35–45)
HDLC SERPL-MCNC: 45 MG/DL (ref 40–60)
HDLC SERPL: 3.6 {RATIO} (ref 0–5)
HGB BLD-MCNC: 8.3 G/DL (ref 12–16)
IMM GRANULOCYTES # BLD AUTO: 0 K/UL (ref 0–0.04)
IMM GRANULOCYTES NFR BLD AUTO: 0 % (ref 0–0.5)
LDLC SERPL CALC-MCNC: 99.2 MG/DL (ref 0–100)
LIPID PROFILE,FLP: NORMAL
LYMPHOCYTES # BLD: 1.6 K/UL (ref 0.9–3.6)
LYMPHOCYTES NFR BLD: 38 % (ref 21–52)
MCH RBC QN AUTO: 19.4 PG (ref 24–34)
MCHC RBC AUTO-ENTMCNC: 29.1 G/DL (ref 31–37)
MCV RBC AUTO: 66.7 FL (ref 78–100)
MONOCYTES # BLD: 0.5 K/UL (ref 0.05–1.2)
MONOCYTES NFR BLD: 12 % (ref 3–10)
NEUTS SEG # BLD: 1.7 K/UL (ref 1.8–8)
NEUTS SEG NFR BLD: 41 % (ref 40–73)
NRBC # BLD: 0 K/UL (ref 0–0.01)
NRBC BLD-RTO: 0 PER 100 WBC
PLATELET # BLD AUTO: 332 K/UL (ref 135–420)
PLATELET COMMENTS,PCOM: ABNORMAL
PMV BLD AUTO: 10 FL (ref 9.2–11.8)
POTASSIUM SERPL-SCNC: 4 MMOL/L (ref 3.5–5.5)
PROT SERPL-MCNC: 6.8 G/DL (ref 6.4–8.2)
RBC # BLD AUTO: 4.27 M/UL (ref 4.2–5.3)
RBC MORPH BLD: ABNORMAL
SODIUM SERPL-SCNC: 140 MMOL/L (ref 136–145)
TRIGL SERPL-MCNC: 84 MG/DL (ref ?–150)
VLDLC SERPL CALC-MCNC: 16.8 MG/DL
WBC # BLD AUTO: 4.1 K/UL (ref 4.6–13.2)

## 2022-11-08 PROCEDURE — 85025 COMPLETE CBC W/AUTO DIFF WBC: CPT

## 2022-11-08 PROCEDURE — 86803 HEPATITIS C AB TEST: CPT

## 2022-11-08 PROCEDURE — 80061 LIPID PANEL: CPT

## 2022-11-08 PROCEDURE — 80053 COMPREHEN METABOLIC PANEL: CPT

## 2022-11-08 PROCEDURE — 36415 COLL VENOUS BLD VENIPUNCTURE: CPT

## 2022-11-09 LAB
HCV AB SER IA-ACNC: 0.28 INDEX
HCV AB SERPL QL IA: NEGATIVE
HCV COMMENT,HCGAC: NORMAL

## 2022-11-10 PROBLEM — D72.819 LEUKOPENIA: Status: ACTIVE | Noted: 2022-11-10

## 2022-11-10 RX ORDER — IBUPROFEN 800 MG/1
800 TABLET ORAL
COMMUNITY

## 2022-11-10 RX ORDER — THERA TABS 400 MCG
1 TAB ORAL EVERY OTHER DAY
COMMUNITY

## 2022-11-10 NOTE — PROGRESS NOTES
Chief Complaint   Patient presents with    Results     Discuss lab results     Assessment & Plan:     1. Microcytic anemia  Assessment & Plan:  Worsening, referral to heme/onc for management  Advised pt to take ferrous sulfate daily, until seen by heme/onc  May take colace PRN for constipation  Orders:  -     REFERRAL TO HEMATOLOGY  -     ferrous sulfate (Iron) 325 mg (65 mg iron) tablet; Take 1 Tablet by mouth daily. , Normal, Disp-90 Tablet, R-0  2. Leukopenia, unspecified type  Assessment & Plan:  Worsening, asymptomatic, referral to heme/onc for management  Orders:  -     REFERRAL TO HEMATOLOGY  3. Menorrhagia with irregular cycle  Assessment & Plan:  Worsening, advised pt to follow up with GYN for possible worsening of uterine fibroids  4. Numerous skin moles  Assessment & Plan:  Stable, referral to dermatology for possible removal  Orders:  -     REFERRAL TO DERMATOLOGY  5. Encounter to discuss test results    Follow-up and Dispositions    Return in about 3 months (around 2/11/2023) for anemia, leukopenia.        Subjective:     HPI    Microcytic Anemia -   Current symptoms: eating ice, heavy periods, SOB with evening walks  Risk factors: hx of microcytic anemia  Treatment: occasionally takes OTC iron pills during heavy periods  Seen by heme/onc in the past: yes  Iron infusions in the past    Leukopenia  Fevers: none  Chills: none  Nightsweats: none  Mother runs low, WBC    Menorrhagia  Duration: alternates, one month heavy, one month lighter,   Characteristics: this past month was not too bad  Associated symptoms: bleeds atleast 7 days, with heavy bleeding  Treatment: tries to take iron pills, makes her so constipated and bloated  Followed by GYN: yes, last time was seen by [facility], had a myomectomy -  removed thyroids vaginally, has had it done three times, did not get everything last time  Last time saw her was 2020    Mole  Left and right eyelid  Both are getting larger  No pain  Wants to have removed    Health Maintenance  Tetanus vaccine: has received  COVID vaccine: received 3 doses    Review of Systems   Constitutional:  Negative for chills, fever and malaise/fatigue. Respiratory:  Negative for shortness of breath. Cardiovascular:  Negative for chest pain and palpitations. Neurological:  Negative for dizziness. Objective:   /71 (BP 1 Location: Left upper arm, BP Patient Position: Sitting, BP Cuff Size: Large adult)   Pulse 69   Temp 98.3 °F (36.8 °C) (Oral)   Resp 20   Ht 5' 2.5\" (1.588 m)   Wt 198 lb 6.4 oz (90 kg)   SpO2 99%   BMI 35.71 kg/m²      Physical Exam  Vitals and nursing note reviewed. Constitutional:       General: She is not in acute distress. Appearance: She is not ill-appearing. HENT:      Head: Normocephalic and atraumatic. Cardiovascular:      Rate and Rhythm: Normal rate and regular rhythm. Pulmonary:      Effort: Pulmonary effort is normal. No respiratory distress. Breath sounds: No wheezing, rhonchi or rales. Skin:     General: Skin is warm and dry. Neurological:      General: No focal deficit present. Mental Status: She is alert and oriented to person, place, and time. Psychiatric:         Mood and Affect: Mood normal.         Thought Content:  Thought content normal.         Judgment: Judgment normal.      ELIGIO Lucas

## 2022-11-10 NOTE — ASSESSMENT & PLAN NOTE
Worsening, referral to heme/onc for management  Advised pt to take ferrous sulfate daily, until seen by heme/onc  May take colace PRN for constipation

## 2022-11-11 ENCOUNTER — TELEPHONE (OUTPATIENT)
Dept: FAMILY MEDICINE CLINIC | Age: 37
End: 2022-11-11

## 2022-11-11 ENCOUNTER — OFFICE VISIT (OUTPATIENT)
Dept: FAMILY MEDICINE CLINIC | Age: 37
End: 2022-11-11
Payer: COMMERCIAL

## 2022-11-11 VITALS
TEMPERATURE: 98.3 F | HEIGHT: 63 IN | DIASTOLIC BLOOD PRESSURE: 71 MMHG | RESPIRATION RATE: 20 BRPM | HEART RATE: 69 BPM | BODY MASS INDEX: 35.15 KG/M2 | SYSTOLIC BLOOD PRESSURE: 109 MMHG | OXYGEN SATURATION: 99 % | WEIGHT: 198.4 LBS

## 2022-11-11 DIAGNOSIS — Z71.2 ENCOUNTER TO DISCUSS TEST RESULTS: ICD-10-CM

## 2022-11-11 DIAGNOSIS — D22.9 NUMEROUS SKIN MOLES: ICD-10-CM

## 2022-11-11 DIAGNOSIS — D72.819 LEUKOPENIA, UNSPECIFIED TYPE: ICD-10-CM

## 2022-11-11 DIAGNOSIS — D50.9 MICROCYTIC ANEMIA: Primary | ICD-10-CM

## 2022-11-11 DIAGNOSIS — N92.1 MENORRHAGIA WITH IRREGULAR CYCLE: ICD-10-CM

## 2022-11-11 PROCEDURE — 99214 OFFICE O/P EST MOD 30 MIN: CPT

## 2022-11-11 RX ORDER — LANOLIN ALCOHOL/MO/W.PET/CERES
325 CREAM (GRAM) TOPICAL DAILY
Qty: 90 TABLET | Refills: 0 | Status: SHIPPED | OUTPATIENT
Start: 2022-11-11

## 2022-11-11 NOTE — TELEPHONE ENCOUNTER
Please let patient know I refilled iron tablets and sent to her pharmacy. Take iron tablets daily, and she may take colace (stool softener) for constipation. Thank you!

## 2022-11-11 NOTE — PROGRESS NOTES
Amos Parrish presents today for   Chief Complaint   Patient presents with    Results     Discuss lab results       Christipenny Mcfadden preferred language for health care discussion is english/other. Is someone accompanying this pt? no    Is the patient using any DME equipment during [address]? no    Depression Screening:  3 most recent PHQ Screens 11/11/2022   Little interest or pleasure in doing things Not at all   Feeling down, depressed, irritable, or hopeless Not at all   Total Score PHQ 2 0       Learning Assessment:  Learning Assessment 10/10/2022   PRIMARY LEARNER Patient   HIGHEST LEVEL OF EDUCATION - PRIMARY LEARNER  -   BARRIERS PRIMARY LEARNER -   [facility]    NEED -   LEARNER PREFERENCE PRIMARY DEMONSTRATION   ANSWERED BY patient   RELATIONSHIP SELF       Abuse Screening:  Abuse Screening Questionnaire 10/10/2022   Do you ever feel afraid of your partner? N   Are you in a relationship with someone who physically or mentally threatens you? N   Is it safe for you to go home? Y       Generalized Anxiety  No flowsheet data found. Health Maintenance Due   Topic Date Due    DTaP/Tdap/Td series (1 - Tdap) Never done    COVID-19 Vaccine (4 - Booster for Moderna series) 02/15/2022   . Health Maintenance reviewed and discussed and ordered per Provider. Coordination of Care:  1. Have you been to the ER, urgent care clinic since your last visit? Hospitalized since your last visit? no    2. Have you seen or consulted any other health care providers outside of the [address] [place] [address] since your last visit? Include any pap smears or colon screening.  no      Advance Directive:  discussed 10/10/22

## 2022-11-18 DIAGNOSIS — D50.0 IRON DEFICIENCY ANEMIA DUE TO CHRONIC BLOOD LOSS: Primary | ICD-10-CM

## 2022-11-28 ENCOUNTER — HOSPITAL ENCOUNTER (OUTPATIENT)
Dept: LAB | Age: 37
Discharge: HOME | End: 2022-11-28
Payer: COMMERCIAL

## 2022-11-28 DIAGNOSIS — D50.0 IRON DEFICIENCY ANEMIA DUE TO CHRONIC BLOOD LOSS: ICD-10-CM

## 2022-11-28 LAB
ALBUMIN SERPL-MCNC: 3.6 G/DL (ref 3.4–5)
ALBUMIN/GLOB SERPL: 1.1 {RATIO} (ref 0.8–1.7)
ALP SERPL-CCNC: 65 U/L (ref 45–117)
ALT SERPL-CCNC: 20 U/L (ref 13–56)
ANION GAP SERPL CALC-SCNC: 4 MMOL/L (ref 3–18)
AST SERPL-CCNC: 19 U/L (ref 10–38)
BASOPHILS # BLD: 0 K/UL (ref 0–0.1)
BASOPHILS NFR BLD: 1 % (ref 0–2)
BILIRUB SERPL-MCNC: 0.8 MG/DL (ref 0.2–1)
BUN SERPL-MCNC: 14 MG/DL (ref 7–18)
BUN/CREAT SERPL: 20 (ref 12–20)
CALCIUM SERPL-MCNC: 8.8 MG/DL (ref 8.5–10.1)
CHLORIDE SERPL-SCNC: 111 MMOL/L (ref 100–111)
CO2 SERPL-SCNC: 26 MMOL/L (ref 21–32)
CREAT SERPL-MCNC: 0.7 MG/DL (ref 0.6–1.3)
DIFFERENTIAL METHOD BLD: ABNORMAL
EOSINOPHIL # BLD: 0.2 K/UL (ref 0–0.4)
EOSINOPHIL NFR BLD: 4 % (ref 0–5)
ERYTHROCYTE [DISTWIDTH] IN BLOOD BY AUTOMATED COUNT: 20.5 % (ref 11.6–14.5)
FERRITIN SERPL-MCNC: 6 NG/ML (ref 8–388)
GLOBULIN SER CALC-MCNC: 3.4 G/DL (ref 2–4)
GLUCOSE SERPL-MCNC: 83 MG/DL (ref 74–99)
HCT VFR BLD AUTO: 27.4 % (ref 35–45)
HGB BLD-MCNC: 7.7 G/DL (ref 12–16)
IMM GRANULOCYTES # BLD AUTO: 0 K/UL (ref 0–0.04)
IMM GRANULOCYTES NFR BLD AUTO: 0 % (ref 0–0.5)
IRON SATN MFR SERPL: 3 % (ref 20–50)
IRON SERPL-MCNC: 14 UG/DL (ref 50–175)
LYMPHOCYTES # BLD: 1.6 K/UL (ref 0.9–3.6)
LYMPHOCYTES NFR BLD: 34 % (ref 21–52)
MCH RBC QN AUTO: 19 PG (ref 24–34)
MCHC RBC AUTO-ENTMCNC: 28.1 G/DL (ref 31–37)
MCV RBC AUTO: 67.5 FL (ref 78–100)
MONOCYTES # BLD: 0.5 K/UL (ref 0.05–1.2)
MONOCYTES NFR BLD: 10 % (ref 3–10)
NEUTS SEG # BLD: 2.3 K/UL (ref 1.8–8)
NEUTS SEG NFR BLD: 51 % (ref 40–73)
NRBC # BLD: 0 K/UL (ref 0–0.01)
NRBC BLD-RTO: 0 PER 100 WBC
PLATELET # BLD AUTO: 304 K/UL (ref 135–420)
PLATELET COMMENTS,PCOM: ABNORMAL
PMV BLD AUTO: 10.2 FL (ref 9.2–11.8)
POTASSIUM SERPL-SCNC: 4 MMOL/L (ref 3.5–5.5)
PROT SERPL-MCNC: 7 G/DL (ref 6.4–8.2)
RBC # BLD AUTO: 4.06 M/UL (ref 4.2–5.3)
RBC MORPH BLD: ABNORMAL
SODIUM SERPL-SCNC: 141 MMOL/L (ref 136–145)
TIBC SERPL-MCNC: 471 UG/DL (ref 250–450)
WBC # BLD AUTO: 4.6 K/UL (ref 4.6–13.2)

## 2022-11-28 PROCEDURE — 82728 ASSAY OF FERRITIN: CPT

## 2022-11-28 PROCEDURE — 85025 COMPLETE CBC W/AUTO DIFF WBC: CPT

## 2022-11-28 PROCEDURE — 83540 ASSAY OF IRON: CPT

## 2022-11-28 PROCEDURE — 80053 COMPREHEN METABOLIC PANEL: CPT

## 2022-11-28 PROCEDURE — 36415 COLL VENOUS BLD VENIPUNCTURE: CPT

## 2022-11-29 ENCOUNTER — OFFICE VISIT (OUTPATIENT)
Dept: ONCOLOGY | Age: 37
End: 2022-11-29
Payer: COMMERCIAL

## 2022-11-29 VITALS
HEART RATE: 75 BPM | WEIGHT: 198.4 LBS | DIASTOLIC BLOOD PRESSURE: 68 MMHG | BODY MASS INDEX: 35.15 KG/M2 | HEIGHT: 63 IN | SYSTOLIC BLOOD PRESSURE: 115 MMHG | OXYGEN SATURATION: 98 % | RESPIRATION RATE: 16 BRPM

## 2022-11-29 DIAGNOSIS — D50.9 MICROCYTIC ANEMIA: ICD-10-CM

## 2022-11-29 DIAGNOSIS — D50.0 IRON DEFICIENCY ANEMIA DUE TO CHRONIC BLOOD LOSS: Primary | ICD-10-CM

## 2022-11-29 PROCEDURE — 99213 OFFICE O/P EST LOW 20 MIN: CPT | Performed by: INTERNAL MEDICINE

## 2022-11-29 NOTE — PROGRESS NOTES
Hematology/Oncology Note    Name: Laura Finder  Date: 2022  : 1985    ELIGIO Moran     Ms. Valentina Stone is a 40y.o. year old female who was seen for Severe iron deficiency anemia     Venofer as needed. Last dose completed on 2018    Subjective:     Ms. Valentina Stone is a 70-year-old woman who had followed up with Dr. Sharla Tyson previously for anemia. At previous visit she was ordered for intravenous iron replenishment therapy with Venofer. She has completed this without any reactions noticed. She is in the office today for evaluation of iron deficiency anemia. She continue to have heavy menstrual periods which last for about 9 to 10-day with a large of blood clots. She has follow-up with her OB/GYN. Today she reported chronic fatigue and low energy level with ice craving. The patient otherwise has no other complaints. Denied fever, chills, night sweat, unintentional weight loss, skin lumps or bumps, acute bleeding or bruising issues. Denied headache, acute vision change, dizziness, chest pain, worsen shortness of breath, palpitation, productive cough, nausea, vomiting, abdominal pain, altered bowel habits, dysuria, new bone pain or back pain, focal numbness or weakness.        Past Medical History:   Diagnosis Date    Anemia     Fibroid, uterine 2013    GERD (gastroesophageal reflux disease)     History of blood product transfusion      Past Surgical History:   Procedure Laterality Date    HX CARPAL TUNNEL RELEASE      bilat    HX MYOMECTOMY   and 2016    HX RIGHT SALPINGO-OOPHORECTOMY  2013    HX WISDOM TEETH EXTRACTION      at age 23     Social History     Socioeconomic History    Marital status:      Spouse name: Not on file    Number of children: Not on file    Years of education: Not on file    Highest education level: Not on file   Occupational History    Not on file   Tobacco Use    Smoking status: Never    Smokeless tobacco: Never   Substance and Sexual Activity Alcohol use: Yes     Alcohol/week: 2.0 standard drinks     Types: 1 Glasses of wine, 1 Shots of liquor per week     Comment: socially, (not at the same time)    Drug use: No    Sexual activity: Yes     Partners: Male     Birth control/protection: None   Other Topics Concern    Not on file   Social History Narrative    Not on file     Social Determinants of Health     Financial Resource Strain: Not on file   Food Insecurity: Not on file   Transportation Needs: Not on file   Physical Activity: Not on file   Stress: Not on file   Social Connections: Not on file   Intimate Partner Violence: Not on file   Housing Stability: Not on file     Family History   Problem Relation Age of Onset    Hypertension Father     Diabetes Father     Diabetes Maternal Grandmother     Hypertension Maternal Grandmother     Arthritis-rheumatoid Maternal Grandfather     Diabetes Paternal Grandmother     Hypertension Paternal Grandmother     Arthritis-rheumatoid Paternal Grandfather     Thyroid Disease Sister     No Known Problems Brother        Review of Systems   Constitutional:  Positive for malaise/fatigue. Negative for chills, diaphoresis, fever and weight loss. Respiratory:  Negative for cough, hemoptysis, shortness of breath and wheezing. Cardiovascular:  Negative for chest pain, palpitations and leg swelling. Gastrointestinal:  Negative for abdominal pain, diarrhea, heartburn, nausea and vomiting. Genitourinary:  Negative for dysuria, frequency, hematuria and urgency. Musculoskeletal:  Negative for joint pain and myalgias. Skin:  Negative for itching and rash. Neurological:  Negative for dizziness, seizures, weakness and headaches. Psychiatric/Behavioral:  Negative for depression. The patient does not have insomnia.            Objective:     Visit Vitals  /68   Pulse 75   Resp 16   Ht 5' 2.5\" (1.588 m)   Wt 90 kg (198 lb 6.4 oz)   SpO2 98%   BMI 35.71 kg/m²       ECOG Performance Status (grade): 0  0 - able to carry on all pre-disease activity w/out restriction  1 - restricted but able to carry out light work  2 - ambulatory and can self- care but unable to carry out work  3 - bed or chair >50% of waking hours  4 - completely disable, total care, confined to bed or chair    Physical Exam  Constitutional:       Appearance: Normal appearance. HENT:      Head: Normocephalic and atraumatic. Eyes:      Pupils: Pupils are equal, round, and reactive to light. Cardiovascular:      Rate and Rhythm: Normal rate and regular rhythm. Heart sounds: Normal heart sounds. Pulmonary:      Effort: Pulmonary effort is normal.      Breath sounds: Normal breath sounds. Abdominal:      General: Bowel sounds are normal.      Palpations: Abdomen is soft. Tenderness: There is no abdominal tenderness. There is no guarding. Musculoskeletal:         General: Normal range of motion. Cervical back: Neck supple. Right lower leg: No edema. Left lower leg: No edema. Skin:     General: Skin is warm. Neurological:      General: No focal deficit present. Mental Status: She is alert and oriented to person, place, and time. Mental status is at baseline. Diagnostics:      Recent Results (from the past 96 hour(s))   CBC WITH AUTOMATED DIFF    Collection Time: 11/28/22  9:29 AM   Result Value Ref Range    WBC 4.6 4.6 - 13.2 K/uL    RBC 4.06 (L) 4.20 - 5.30 M/uL    HGB 7.7 (L) 12.0 - 16.0 g/dL    HCT 27.4 (L) 35.0 - 45.0 %    MCV 67.5 (L) 78.0 - 100.0 FL    MCH 19.0 (L) 24.0 - 34.0 PG    MCHC 28.1 (L) 31.0 - 37.0 g/dL    RDW 20.5 (H) 11.6 - 14.5 %    PLATELET 796 989 - 544 K/uL    MPV 10.2 9.2 - 11.8 FL    NRBC 0.0 0  WBC    ABSOLUTE NRBC 0.00 0.00 - 0.01 K/uL    NEUTROPHILS 51 40 - 73 %    LYMPHOCYTES 34 21 - 52 %    MONOCYTES 10 3 - 10 %    EOSINOPHILS 4 0 - 5 %    BASOPHILS 1 0 - 2 %    IMMATURE GRANULOCYTES 0 0.0 - 0.5 %    ABS. NEUTROPHILS 2.3 1.8 - 8.0 K/UL    ABS.  LYMPHOCYTES 1.6 0.9 - 3.6 K/UL ABS. MONOCYTES 0.5 0.05 - 1.2 K/UL    ABS. EOSINOPHILS 0.2 0.0 - 0.4 K/UL    ABS. BASOPHILS 0.0 0.0 - 0.1 K/UL    ABS. IMM. GRANS. 0.0 0.00 - 0.04 K/UL    DF AUTOMATED      PLATELET COMMENTS ADEQUATE PLATELETS      RBC COMMENTS MICROCYTOSIS  1+        RBC COMMENTS POLYCHROMASIA  1+        RBC COMMENTS HYPOCHROMIA  1+        RBC COMMENTS OVALOCYTES  1+        RBC COMMENTS ANISOCYTOSIS  1+       METABOLIC PANEL, COMPREHENSIVE    Collection Time: 11/28/22  9:29 AM   Result Value Ref Range    Sodium 141 136 - 145 mmol/L    Potassium 4.0 3.5 - 5.5 mmol/L    Chloride 111 100 - 111 mmol/L    CO2 26 21 - 32 mmol/L    Anion gap 4 3.0 - 18 mmol/L    Glucose 83 74 - 99 mg/dL    BUN 14 7.0 - 18 MG/DL    Creatinine 0.70 0.6 - 1.3 MG/DL    BUN/Creatinine ratio 20 12 - 20      eGFR >60 >60 ml/min/1.73m2    Calcium 8.8 8.5 - 10.1 MG/DL    Bilirubin, total 0.8 0.2 - 1.0 MG/DL    ALT (SGPT) 20 13 - 56 U/L    AST (SGOT) 19 10 - 38 U/L    Alk. phosphatase 65 45 - 117 U/L    Protein, total 7.0 6.4 - 8.2 g/dL    Albumin 3.6 3.4 - 5.0 g/dL    Globulin 3.4 2.0 - 4.0 g/dL    A-G Ratio 1.1 0.8 - 1.7     IRON PROFILE    Collection Time: 11/28/22  9:29 AM   Result Value Ref Range    Iron 14 (L) 50 - 175 ug/dL    TIBC 471 (H) 250 - 450 ug/dL    Iron % saturation 3 (L) 20 - 50 %   FERRITIN    Collection Time: 11/28/22  9:29 AM   Result Value Ref Range    Ferritin 6 (L) 8 - 388 NG/ML       Imaging:  No results found for this or any previous visit. No results found for this or any previous visit. No results found for this or any previous visit. Assessment:     1. Iron deficiency anemia due to chronic blood loss    2. Microcytic anemia      Plan:   Microcytic anemia  Iron deficiency Anemia:   -- She had followed up with Dr. Josh Medellin previously for anemia. CBC on 7/16/2018 revealed a WBC count of 4.4, hemoglobin 9.7 g/dL, hematocrit 30.5%, and her platelet count was 894,635.   A hemoglobin electrophoresis revealed normal adult hemoglobin. The iron profile revealed that she had an iron level of 18 mcg/dL with an iron saturation of only 4%. Her ferritin level was quite low at only 11 ng/mL. She was ordered for intravenous iron replenishment therapy with Venofer at a dose of 250 mg every 2 weeks for 4 complete doses. She has completed this on 9/7/2018 without any reactions noticed. -- Hb electrophoresis showed normal adult hemoglobin present.   -- 9/7/2020 CBC showed H/H6.5/24, MCV 63, platelet 003 5K, WBC 7.8K. S.p blood transfusion at [facility]. She did not check iron profiles or ferritin at that time. -- She continue to have heavy menstrual periods which usually lasts for about 9 to 10-day monthly with a large of blood clots. She has follow-up with her OB/GYN and plan for D/C next month. Her last menstrual was on last week. She has taking iron pills twice a day. -- 11/20/20 S.p IV Venofer x 3. Tolerated it well with clinical improving.  -- Today I have reviewed with the patient about recent lab reports. 11/8/2022 CBC reported hemoglobin 8.3, hematocrit 28.5%, MCV 66.7. Platelet 464,772. Plan:  -- We will check her CBC, CMP, Iron profile, ferritin today. -- We will go ahead to arrange for her IV iron Venofer. She was agreeable with the plan. -- She will fy GYN for heavy menstrual bleeding.  -- We will see the patient back in about 4 months. Always sooner if required. The patient can have repeat lab done prior to our next clinic visit. No orders of the defined types were placed in this encounter. Ms. Gigi Haider has a reminder for a \"due or due soon\" health maintenance. I have asked that she contact her primary care provider for follow-up on this health maintenance. All of patient's questions answered to their apparent satisfaction. They verbally show understanding and agreement with aforementioned plan.          Laura Sepulveda MD  11/29/2022          Parts of this document has been produced using Dragon dictation system. Unrecognized errors in transcription may be present. Please do not hesitate to reach out for any questions or clarifications.       CC: ELIGIO Arzate

## 2022-12-01 RX ORDER — ALBUTEROL SULFATE 0.83 MG/ML
2.5 SOLUTION RESPIRATORY (INHALATION) AS NEEDED
Status: CANCELLED
Start: 2022-12-08

## 2022-12-01 RX ORDER — ONDANSETRON 2 MG/ML
8 INJECTION INTRAMUSCULAR; INTRAVENOUS AS NEEDED
Status: CANCELLED | OUTPATIENT
Start: 2022-12-08

## 2022-12-01 RX ORDER — HYDROCORTISONE SODIUM SUCCINATE 100 MG/2ML
100 INJECTION, POWDER, FOR SOLUTION INTRAMUSCULAR; INTRAVENOUS AS NEEDED
Status: CANCELLED | OUTPATIENT
Start: 2022-12-08

## 2022-12-01 RX ORDER — DIPHENHYDRAMINE HYDROCHLORIDE 50 MG/ML
25 INJECTION, SOLUTION INTRAMUSCULAR; INTRAVENOUS AS NEEDED
Status: CANCELLED
Start: 2022-12-08

## 2022-12-01 RX ORDER — DIPHENHYDRAMINE HYDROCHLORIDE 50 MG/ML
50 INJECTION, SOLUTION INTRAMUSCULAR; INTRAVENOUS AS NEEDED
Status: CANCELLED
Start: 2022-12-08

## 2022-12-01 RX ORDER — SODIUM CHLORIDE 9 MG/ML
5-40 INJECTION INTRAMUSCULAR; INTRAVENOUS; SUBCUTANEOUS AS NEEDED
Status: CANCELLED | OUTPATIENT
Start: 2022-12-08

## 2022-12-01 RX ORDER — HEPARIN 100 UNIT/ML
500 SYRINGE INTRAVENOUS AS NEEDED
Status: CANCELLED
Start: 2022-12-08

## 2022-12-01 RX ORDER — SODIUM CHLORIDE 9 MG/ML
5-250 INJECTION, SOLUTION INTRAVENOUS AS NEEDED
Status: CANCELLED | OUTPATIENT
Start: 2022-12-08

## 2022-12-01 RX ORDER — ACETAMINOPHEN 325 MG/1
650 TABLET ORAL AS NEEDED
Status: CANCELLED
Start: 2022-12-08

## 2022-12-01 RX ORDER — EPINEPHRINE 1 MG/ML
0.3 INJECTION, SOLUTION, CONCENTRATE INTRAVENOUS AS NEEDED
Status: CANCELLED | OUTPATIENT
Start: 2022-12-08

## 2022-12-08 ENCOUNTER — HOSPITAL ENCOUNTER (OUTPATIENT)
Dept: INFUSION THERAPY | Age: 37
End: 2022-12-08
Payer: COMMERCIAL

## 2022-12-08 VITALS
OXYGEN SATURATION: 100 % | DIASTOLIC BLOOD PRESSURE: 65 MMHG | RESPIRATION RATE: 16 BRPM | HEART RATE: 76 BPM | TEMPERATURE: 97.7 F | SYSTOLIC BLOOD PRESSURE: 103 MMHG

## 2022-12-08 DIAGNOSIS — D50.0 IRON DEFICIENCY ANEMIA DUE TO CHRONIC BLOOD LOSS: Primary | ICD-10-CM

## 2022-12-08 PROCEDURE — 74011250636 HC RX REV CODE- 250/636: Performed by: INTERNAL MEDICINE

## 2022-12-08 PROCEDURE — 74011000250 HC RX REV CODE- 250: Performed by: INTERNAL MEDICINE

## 2022-12-08 PROCEDURE — 96365 THER/PROPH/DIAG IV INF INIT: CPT

## 2022-12-08 PROCEDURE — 96366 THER/PROPH/DIAG IV INF ADDON: CPT

## 2022-12-08 RX ORDER — SODIUM CHLORIDE 9 MG/ML
5-250 INJECTION, SOLUTION INTRAVENOUS AS NEEDED
Status: DISPENSED | OUTPATIENT
Start: 2022-12-08 | End: 2022-12-08

## 2022-12-08 RX ORDER — ALBUTEROL SULFATE 0.83 MG/ML
2.5 SOLUTION RESPIRATORY (INHALATION) AS NEEDED
Start: 2022-12-22

## 2022-12-08 RX ORDER — HYDROCORTISONE SODIUM SUCCINATE 100 MG/2ML
100 INJECTION, POWDER, FOR SOLUTION INTRAMUSCULAR; INTRAVENOUS AS NEEDED
OUTPATIENT
Start: 2022-12-22

## 2022-12-08 RX ORDER — SODIUM CHLORIDE 0.9 % (FLUSH) 0.9 %
5-40 SYRINGE (ML) INJECTION AS NEEDED
OUTPATIENT
Start: 2022-12-22

## 2022-12-08 RX ORDER — SODIUM CHLORIDE 0.9 % (FLUSH) 0.9 %
5-40 SYRINGE (ML) INJECTION AS NEEDED
Status: DISPENSED | OUTPATIENT
Start: 2022-12-08 | End: 2022-12-08

## 2022-12-08 RX ORDER — SODIUM CHLORIDE 9 MG/ML
5-250 INJECTION, SOLUTION INTRAVENOUS AS NEEDED
OUTPATIENT
Start: 2022-12-22

## 2022-12-08 RX ORDER — EPINEPHRINE 1 MG/ML
0.3 INJECTION, SOLUTION, CONCENTRATE INTRAVENOUS AS NEEDED
OUTPATIENT
Start: 2022-12-22

## 2022-12-08 RX ORDER — DIPHENHYDRAMINE HYDROCHLORIDE 50 MG/ML
25 INJECTION, SOLUTION INTRAMUSCULAR; INTRAVENOUS AS NEEDED
Start: 2022-12-22

## 2022-12-08 RX ORDER — SODIUM CHLORIDE 9 MG/ML
5-40 INJECTION INTRAMUSCULAR; INTRAVENOUS; SUBCUTANEOUS AS NEEDED
OUTPATIENT
Start: 2022-12-22

## 2022-12-08 RX ORDER — ACETAMINOPHEN 325 MG/1
650 TABLET ORAL AS NEEDED
Start: 2022-12-22

## 2022-12-08 RX ORDER — ONDANSETRON 2 MG/ML
8 INJECTION INTRAMUSCULAR; INTRAVENOUS AS NEEDED
OUTPATIENT
Start: 2022-12-22

## 2022-12-08 RX ORDER — HEPARIN 100 UNIT/ML
500 SYRINGE INTRAVENOUS AS NEEDED
Start: 2022-12-22

## 2022-12-08 RX ORDER — DIPHENHYDRAMINE HYDROCHLORIDE 50 MG/ML
50 INJECTION, SOLUTION INTRAMUSCULAR; INTRAVENOUS AS NEEDED
Start: 2022-12-22

## 2022-12-08 RX ADMIN — SODIUM CHLORIDE, PRESERVATIVE FREE 10 ML: 5 INJECTION INTRAVENOUS at 09:22

## 2022-12-08 RX ADMIN — SODIUM CHLORIDE 25 ML/HR: 9 INJECTION, SOLUTION INTRAVENOUS at 09:24

## 2022-12-08 RX ADMIN — SODIUM CHLORIDE, PRESERVATIVE FREE 10 ML: 5 INJECTION INTRAVENOUS at 09:37

## 2022-12-08 RX ADMIN — IRON SUCROSE 300 MG: 20 INJECTION, SOLUTION INTRAVENOUS at 09:37

## 2022-12-08 NOTE — PROGRESS NOTES
[facility] Progress Note    Date: 2022    Name: Gurwinder Moss    MRN: [id number]         : 1985      Ms. Mcfadden arrived in the [facility] today at 0915, here for Venofer #1 of 3. She was assessed and education was provided. Pt reports tolerating her previous iron infusions well in the past and politely declined written education today. Ms. Mcfadden's vitals were reviewed. Visit Vitals  /65 (BP 1 Location: Left upper arm, BP Patient Position: Sitting)   Pulse 76   Temp 97.7 °F (36.5 °C)   Resp 16   SpO2 100%   Breastfeeding No     22g PIV inserted to RAC x1 attempt after 24g to LAC was unable to flush and was removed. Brisk blood return obtained and flushed well with NS. Venofer (Iron Sucrose) 300 mg IV was administered over approximately 90 min. Ms. Yoel Titus tolerated well and had no complaints. Pt politely declined to stay for observation. No s/s of reaction. VS stable. PIV flushed and removed. Gauze/coban applied. Discharge/ follow-up instructions discussed w/ pt. Pt verbalized understanding. Pt armband removed and shredded. Ms. Yoel Titus was discharged from [place] [address] in stable condition at 1125. She is to return on 22 at 0900 for her next appointment, for Venofer #2 of 3.     Daryle Robes, RN  2022

## 2022-12-22 ENCOUNTER — HOSPITAL ENCOUNTER (OUTPATIENT)
Dept: INFUSION THERAPY | Age: 37
End: 2022-12-22
Payer: COMMERCIAL

## 2022-12-22 VITALS
SYSTOLIC BLOOD PRESSURE: 100 MMHG | RESPIRATION RATE: 16 BRPM | HEART RATE: 76 BPM | TEMPERATURE: 98.3 F | OXYGEN SATURATION: 98 % | DIASTOLIC BLOOD PRESSURE: 64 MMHG

## 2022-12-22 DIAGNOSIS — D50.0 IRON DEFICIENCY ANEMIA DUE TO CHRONIC BLOOD LOSS: Primary | ICD-10-CM

## 2022-12-22 PROCEDURE — 74011250636 HC RX REV CODE- 250/636: Performed by: INTERNAL MEDICINE

## 2022-12-22 PROCEDURE — 74011000250 HC RX REV CODE- 250: Performed by: INTERNAL MEDICINE

## 2022-12-22 PROCEDURE — 96365 THER/PROPH/DIAG IV INF INIT: CPT

## 2022-12-22 PROCEDURE — 96366 THER/PROPH/DIAG IV INF ADDON: CPT

## 2022-12-22 RX ORDER — SODIUM CHLORIDE 9 MG/ML
5-250 INJECTION, SOLUTION INTRAVENOUS AS NEEDED
OUTPATIENT
Start: 2023-01-05

## 2022-12-22 RX ORDER — SODIUM CHLORIDE 0.9 % (FLUSH) 0.9 %
5-40 SYRINGE (ML) INJECTION AS NEEDED
OUTPATIENT
Start: 2023-01-05

## 2022-12-22 RX ORDER — ONDANSETRON 2 MG/ML
8 INJECTION INTRAMUSCULAR; INTRAVENOUS AS NEEDED
OUTPATIENT
Start: 2023-01-05

## 2022-12-22 RX ORDER — SODIUM CHLORIDE 0.9 % (FLUSH) 0.9 %
5-40 SYRINGE (ML) INJECTION AS NEEDED
Status: DISPENSED | OUTPATIENT
Start: 2022-12-22 | End: 2022-12-22

## 2022-12-22 RX ORDER — DIPHENHYDRAMINE HYDROCHLORIDE 50 MG/ML
50 INJECTION, SOLUTION INTRAMUSCULAR; INTRAVENOUS AS NEEDED
Start: 2023-01-05

## 2022-12-22 RX ORDER — ALBUTEROL SULFATE 0.83 MG/ML
2.5 SOLUTION RESPIRATORY (INHALATION) AS NEEDED
Start: 2023-01-05

## 2022-12-22 RX ORDER — EPINEPHRINE 1 MG/ML
0.3 INJECTION, SOLUTION, CONCENTRATE INTRAVENOUS AS NEEDED
OUTPATIENT
Start: 2023-01-05

## 2022-12-22 RX ORDER — DIPHENHYDRAMINE HYDROCHLORIDE 50 MG/ML
25 INJECTION, SOLUTION INTRAMUSCULAR; INTRAVENOUS AS NEEDED
Start: 2023-01-05

## 2022-12-22 RX ORDER — SODIUM CHLORIDE 9 MG/ML
5-40 INJECTION INTRAMUSCULAR; INTRAVENOUS; SUBCUTANEOUS AS NEEDED
OUTPATIENT
Start: 2023-01-05

## 2022-12-22 RX ORDER — HEPARIN 100 UNIT/ML
500 SYRINGE INTRAVENOUS AS NEEDED
Start: 2023-01-05

## 2022-12-22 RX ORDER — ACETAMINOPHEN 325 MG/1
650 TABLET ORAL AS NEEDED
Start: 2023-01-05

## 2022-12-22 RX ORDER — HYDROCORTISONE SODIUM SUCCINATE 100 MG/2ML
100 INJECTION, POWDER, FOR SOLUTION INTRAMUSCULAR; INTRAVENOUS AS NEEDED
OUTPATIENT
Start: 2023-01-05

## 2022-12-22 RX ADMIN — SODIUM CHLORIDE, PRESERVATIVE FREE 10 ML: 5 INJECTION INTRAVENOUS at 09:25

## 2022-12-22 RX ADMIN — SODIUM CHLORIDE, PRESERVATIVE FREE 10 ML: 5 INJECTION INTRAVENOUS at 11:09

## 2022-12-22 RX ADMIN — IRON SUCROSE 300 MG: 20 INJECTION, SOLUTION INTRAVENOUS at 09:30

## 2022-12-22 NOTE — PROGRESS NOTES
[facility] Progress Note    Date: 2022    Name: Jin Brothers    MRN: [id number]         : 1985      Ms. Mcfadden arrived in the [facility] today at 0900, here for Venofer #2 of 3. She was assessed and education was provided. Pt reports tolerating her previous iron infusions well in the past.    Ms. Mcfadden's vitals were reviewed. Visit Vitals  /64 (BP 1 Location: Right upper arm, BP Patient Position: At rest;Sitting)   Pulse 76   Temp 98.3 °F (36.8 °C)   Resp 16   SpO2 98%     24g PIV inserted in left posterior hand on 2nd attempt. Brisk blood return obtained and flushed well with NS. Venofer (Iron Sucrose) 300 mg IV was administered over approximately 90 min. Ms. Jinny Harrison tolerated well and had no complaints. Pt politely declined to stay for observation. No s/s of reaction. VS stable. Patient Vitals for the past 8 hrs:   Temp Pulse Resp BP SpO2   22 1106 98.3 °F (36.8 °C) 76 16 100/64 98 %   22 0905 98 °F (36.7 °C) 77 16 99/67 100 %        PIV flushed and removed. Gauze/coban applied. Discharge/ follow-up instructions discussed w/ pt. Pt verbalized understanding. Pt armband removed and shredded. Ms. Jinny Harrison was discharged from [place] [address] in stable condition at 1110. She is to return on 23 at 0900 for her next appointment of Casi Shah3 of 3.     Wallace Najjar, RN  2022

## 2023-01-05 ENCOUNTER — HOSPITAL ENCOUNTER (OUTPATIENT)
Dept: INFUSION THERAPY | Age: 38
End: 2023-01-05
Payer: COMMERCIAL

## 2023-01-05 VITALS
SYSTOLIC BLOOD PRESSURE: 97 MMHG | OXYGEN SATURATION: 95 % | TEMPERATURE: 98 F | DIASTOLIC BLOOD PRESSURE: 61 MMHG | RESPIRATION RATE: 16 BRPM | HEART RATE: 78 BPM

## 2023-01-05 DIAGNOSIS — D50.0 IRON DEFICIENCY ANEMIA DUE TO CHRONIC BLOOD LOSS: Primary | ICD-10-CM

## 2023-01-05 PROCEDURE — 74011250636 HC RX REV CODE- 250/636: Performed by: INTERNAL MEDICINE

## 2023-01-05 PROCEDURE — 96366 THER/PROPH/DIAG IV INF ADDON: CPT

## 2023-01-05 PROCEDURE — 74011000250 HC RX REV CODE- 250: Performed by: INTERNAL MEDICINE

## 2023-01-05 PROCEDURE — 96365 THER/PROPH/DIAG IV INF INIT: CPT

## 2023-01-05 RX ORDER — HYDROCORTISONE SODIUM SUCCINATE 100 MG/2ML
100 INJECTION, POWDER, FOR SOLUTION INTRAMUSCULAR; INTRAVENOUS AS NEEDED
OUTPATIENT
Start: 2023-01-05

## 2023-01-05 RX ORDER — EPINEPHRINE 1 MG/ML
0.3 INJECTION, SOLUTION, CONCENTRATE INTRAVENOUS AS NEEDED
OUTPATIENT
Start: 2023-01-05

## 2023-01-05 RX ORDER — DIPHENHYDRAMINE HYDROCHLORIDE 50 MG/ML
50 INJECTION, SOLUTION INTRAMUSCULAR; INTRAVENOUS AS NEEDED
Start: 2023-01-05

## 2023-01-05 RX ORDER — ACETAMINOPHEN 325 MG/1
650 TABLET ORAL AS NEEDED
Start: 2023-01-05

## 2023-01-05 RX ORDER — SODIUM CHLORIDE 9 MG/ML
5-250 INJECTION, SOLUTION INTRAVENOUS AS NEEDED
OUTPATIENT
Start: 2023-01-05

## 2023-01-05 RX ORDER — SODIUM CHLORIDE 9 MG/ML
5-40 INJECTION INTRAVENOUS AS NEEDED
OUTPATIENT
Start: 2023-01-05

## 2023-01-05 RX ORDER — HEPARIN 100 UNIT/ML
500 SYRINGE INTRAVENOUS AS NEEDED
Start: 2023-01-05

## 2023-01-05 RX ORDER — ALBUTEROL SULFATE 0.83 MG/ML
2.5 SOLUTION RESPIRATORY (INHALATION) AS NEEDED
Start: 2023-01-05

## 2023-01-05 RX ORDER — SODIUM CHLORIDE 9 MG/ML
5-40 INJECTION INTRAVENOUS AS NEEDED
Status: ACTIVE | OUTPATIENT
Start: 2023-01-05 | End: 2023-01-05

## 2023-01-05 RX ORDER — ONDANSETRON 2 MG/ML
8 INJECTION INTRAMUSCULAR; INTRAVENOUS AS NEEDED
OUTPATIENT
Start: 2023-01-05

## 2023-01-05 RX ORDER — SODIUM CHLORIDE 9 MG/ML
5-250 INJECTION, SOLUTION INTRAVENOUS AS NEEDED
Status: DISPENSED | OUTPATIENT
Start: 2023-01-05 | End: 2023-01-05

## 2023-01-05 RX ORDER — DIPHENHYDRAMINE HYDROCHLORIDE 50 MG/ML
25 INJECTION, SOLUTION INTRAMUSCULAR; INTRAVENOUS AS NEEDED
Start: 2023-01-05

## 2023-01-05 RX ORDER — SODIUM CHLORIDE 0.9 % (FLUSH) 0.9 %
5-40 SYRINGE (ML) INJECTION AS NEEDED
OUTPATIENT
Start: 2023-01-05

## 2023-01-05 RX ADMIN — SODIUM CHLORIDE 50 ML/HR: 9 INJECTION, SOLUTION INTRAVENOUS at 09:25

## 2023-01-05 RX ADMIN — Medication 10 ML: at 09:24

## 2023-01-05 RX ADMIN — Medication 10 ML: at 12:16

## 2023-01-05 RX ADMIN — IRON SUCROSE 400 MG: 20 INJECTION, SOLUTION INTRAVENOUS at 09:29

## 2023-01-05 NOTE — PROGRESS NOTES
[facility] Progress Note    Date: 2023    Name: Cele Stephens    MRN: [id number]         : 1985      Ms. Mcfadden arrived in the [facility] today at 0900, here for Venofer #3 of 3. She was assessed and education was provided. Pt reports tolerating her previous iron infusions well. Ms. Mcfadden's vitals were reviewed. Pt denies any dizziness/drowsiness. Visit Vitals  BP 99/66 (BP 1 Location: Left upper arm, BP Patient Position: Sitting)   Pulse 81   Temp 98.1 °F (36.7 °C)   Resp 18   SpO2 95%   Breastfeeding No     24g PIV inserted to L posterior hand x2 attempt. Brisk blood return obtained and flushed well with NS. Venofer (Iron Sucrose) 400 mg IV was administered over approximately 150 min. Ms. Luis Alfredo Carpio tolerated well and had no complaints. Patient Vitals for the past 12 hrs:   Temp Pulse Resp BP SpO2   23 1215 98 °F (36.7 °C) 78 16 97/61 95 %   23 0902 98.1 °F (36.7 °C) 81 18 99/66 95 %        PIV flushed and removed. Gauze/coban applied. Discharge/ follow-up instructions discussed w/ pt. Pt verbalized understanding. Pt armband removed and shredded. Ms. Luis Alfredo Carpio was discharged from [place] [address] in stable condition at 1220. She is to return to follow up with MD Lester on 2023 at [address] [place] [address]     Tamie Kenny RN  2023

## 2023-03-21 DIAGNOSIS — D50.0 IRON DEFICIENCY ANEMIA SECONDARY TO BLOOD LOSS (CHRONIC): Primary | ICD-10-CM

## 2023-03-23 ENCOUNTER — TELEPHONE (OUTPATIENT)
Age: 38
End: 2023-03-23

## 2023-03-27 ENCOUNTER — TELEPHONE (OUTPATIENT)
Age: 38
End: 2023-03-27

## 2023-03-27 NOTE — TELEPHONE ENCOUNTER
Pt. Called to let us know she is going to [facility] outpatient lab tomorrow to have current labs drawn. Labs are ordered.

## 2023-03-28 ENCOUNTER — HOSPITAL ENCOUNTER (OUTPATIENT)
Facility: HOSPITAL | Age: 38
Discharge: HOME | End: 2023-03-31
Payer: COMMERCIAL

## 2023-03-28 LAB
ALBUMIN SERPL-MCNC: 3.6 G/DL (ref 3.4–5)
ALBUMIN/GLOB SERPL: 1.2 (ref 0.8–1.7)
ALP SERPL-CCNC: 64 U/L (ref 45–117)
ALT SERPL-CCNC: 20 U/L (ref 13–56)
ANION GAP SERPL CALC-SCNC: 1 MMOL/L (ref 3–18)
AST SERPL-CCNC: 9 U/L (ref 10–38)
BASOPHILS # BLD: 0 K/UL (ref 0–0.1)
BASOPHILS NFR BLD: 1 % (ref 0–2)
BILIRUB SERPL-MCNC: 0.7 MG/DL (ref 0.2–1)
BUN SERPL-MCNC: 13 MG/DL (ref 7–18)
BUN/CREAT SERPL: 19 (ref 12–20)
CALCIUM SERPL-MCNC: 8.7 MG/DL (ref 8.5–10.1)
CHLORIDE SERPL-SCNC: 111 MMOL/L (ref 100–111)
CO2 SERPL-SCNC: 26 MMOL/L (ref 21–32)
CREAT SERPL-MCNC: 0.67 MG/DL (ref 0.6–1.3)
DIFFERENTIAL METHOD BLD: ABNORMAL
EOSINOPHIL # BLD: 0.2 K/UL (ref 0–0.4)
EOSINOPHIL NFR BLD: 5 % (ref 0–5)
ERYTHROCYTE [DISTWIDTH] IN BLOOD BY AUTOMATED COUNT: 17.1 % (ref 11.6–14.5)
GLOBULIN SER CALC-MCNC: 3.1 G/DL (ref 2–4)
GLUCOSE SERPL-MCNC: 90 MG/DL (ref 74–99)
HCT VFR BLD AUTO: 31.3 % (ref 35–45)
HGB BLD-MCNC: 9.6 G/DL (ref 12–16)
IMM GRANULOCYTES # BLD AUTO: 0 K/UL (ref 0–0.04)
IMM GRANULOCYTES NFR BLD AUTO: 0 % (ref 0–0.5)
IRON SATN MFR SERPL: 5 % (ref 20–50)
IRON SERPL-MCNC: 21 UG/DL (ref 50–175)
LYMPHOCYTES # BLD: 1.3 K/UL (ref 0.9–3.6)
LYMPHOCYTES NFR BLD: 29 % (ref 21–52)
MCH RBC QN AUTO: 23.9 PG (ref 24–34)
MCHC RBC AUTO-ENTMCNC: 30.7 G/DL (ref 31–37)
MCV RBC AUTO: 77.9 FL (ref 78–100)
MONOCYTES # BLD: 0.5 K/UL (ref 0.05–1.2)
MONOCYTES NFR BLD: 11 % (ref 3–10)
NEUTS SEG # BLD: 2.5 K/UL (ref 1.8–8)
NEUTS SEG NFR BLD: 54 % (ref 40–73)
NRBC # BLD: 0 K/UL (ref 0–0.01)
NRBC BLD-RTO: 0 PER 100 WBC
PLATELET # BLD AUTO: 258 K/UL (ref 135–420)
PMV BLD AUTO: 9.9 FL (ref 9.2–11.8)
POTASSIUM SERPL-SCNC: 3.9 MMOL/L (ref 3.5–5.5)
PROT SERPL-MCNC: 6.7 G/DL (ref 6.4–8.2)
RBC # BLD AUTO: 4.02 M/UL (ref 4.2–5.3)
SODIUM SERPL-SCNC: 138 MMOL/L (ref 136–145)
TIBC SERPL-MCNC: 448 UG/DL (ref 250–450)
WBC # BLD AUTO: 4.6 K/UL (ref 4.6–13.2)

## 2023-03-28 PROCEDURE — 80053 COMPREHEN METABOLIC PANEL: CPT

## 2023-03-28 PROCEDURE — 85025 COMPLETE CBC W/AUTO DIFF WBC: CPT

## 2023-03-28 PROCEDURE — 36415 COLL VENOUS BLD VENIPUNCTURE: CPT

## 2023-03-28 PROCEDURE — 83540 ASSAY OF IRON: CPT

## 2023-03-30 ENCOUNTER — OFFICE VISIT (OUTPATIENT)
Age: 38
End: 2023-03-30
Payer: COMMERCIAL

## 2023-03-30 VITALS
RESPIRATION RATE: 16 BRPM | HEART RATE: 67 BPM | BODY MASS INDEX: 36.32 KG/M2 | DIASTOLIC BLOOD PRESSURE: 68 MMHG | OXYGEN SATURATION: 99 % | SYSTOLIC BLOOD PRESSURE: 103 MMHG | WEIGHT: 205 LBS | HEIGHT: 63 IN

## 2023-03-30 DIAGNOSIS — D50.9 MICROCYTIC ANEMIA: ICD-10-CM

## 2023-03-30 DIAGNOSIS — D50.0 IRON DEFICIENCY ANEMIA SECONDARY TO BLOOD LOSS (CHRONIC): Primary | ICD-10-CM

## 2023-03-30 PROCEDURE — 99213 OFFICE O/P EST LOW 20 MIN: CPT | Performed by: INTERNAL MEDICINE

## 2023-03-30 ASSESSMENT — ENCOUNTER SYMPTOMS
BACK PAIN: 0
ABDOMINAL PAIN: 0
DIARRHEA: 0
SHORTNESS OF BREATH: 0
COUGH: 0
NAUSEA: 0
VOMITING: 0

## 2023-03-30 NOTE — PROGRESS NOTES
Hematology/Oncology Note      Date: 3/30/2023    Name: Conner Arreola  : 1985     MATEUSZ Jones      Subjective:     Chief complaint: Iron deficiency anemia     History of Present Illness:   Ms. Keegan Hancock is a most pleasant 45y.o. year old female who was seen for consultation of iron deficiency anemia. The patient reported following GYN for fibroids with conservative management at this times. She has regular menstrual with heavy bleeding for at least 4-5 each month. She experienced fatigue and occasional lightheadedness. The patient denied new complaints or concerns. Denied fever, chills, night sweat, unintentional weight loss, skin lumps or bumps, acute bleeding or bruising issues. Denied headache, acute vision change, dizziness, chest pain, worsen shortness of breath, palpitation, productive cough, nausea, vomiting, abdominal pain, altered bowel habits, dysuria, worsen bone pain or back pain, new focal numbness or weakness. Past Medical History, Family History, and Social History:    Past Medical History:   Diagnosis Date    Anemia     Fibroid, uterine     GERD (gastroesophageal reflux disease)     History of blood product transfusion      Past Surgical History:   Procedure Laterality Date    CARPAL TUNNEL RELEASE      bilat    MYOMECTOMY  2013 and 2016    SALPINGO-OOPHORECTOMY  2013    WISDOM TOOTH EXTRACTION      at age 23     Social History     Tobacco Use    Smoking status: Never    Smokeless tobacco: Never   Substance Use Topics    Alcohol use:  Yes     Alcohol/week: 2.0 standard drinks    Drug use: No      Family History   Problem Relation Age of Onset    Diabetes Father     Thyroid Disease Sister     Rheum Arthritis Paternal Grandfather     Hypertension Paternal Grandmother     Diabetes Paternal Grandmother     Rheum Arthritis Maternal Grandfather     Hypertension Maternal Grandmother     Diabetes Maternal Grandmother     Hypertension Father     No Known Problems Brother

## 2023-03-31 RX ORDER — ONDANSETRON 2 MG/ML
8 INJECTION INTRAMUSCULAR; INTRAVENOUS
Status: CANCELLED | OUTPATIENT
Start: 2023-04-07

## 2023-03-31 RX ORDER — ALBUTEROL SULFATE 90 UG/1
4 AEROSOL, METERED RESPIRATORY (INHALATION) PRN
Status: CANCELLED | OUTPATIENT
Start: 2023-04-07

## 2023-03-31 RX ORDER — HEPARIN SODIUM (PORCINE) LOCK FLUSH IV SOLN 100 UNIT/ML 100 UNIT/ML
500 SOLUTION INTRAVENOUS PRN
Status: CANCELLED | OUTPATIENT
Start: 2023-04-07

## 2023-03-31 RX ORDER — EPINEPHRINE 1 MG/ML
0.3 INJECTION, SOLUTION, CONCENTRATE INTRAVENOUS PRN
Status: CANCELLED | OUTPATIENT
Start: 2023-04-07

## 2023-03-31 RX ORDER — SODIUM CHLORIDE 9 MG/ML
5-250 INJECTION, SOLUTION INTRAVENOUS PRN
Status: CANCELLED | OUTPATIENT
Start: 2023-04-07

## 2023-03-31 RX ORDER — DIPHENHYDRAMINE HYDROCHLORIDE 50 MG/ML
50 INJECTION INTRAMUSCULAR; INTRAVENOUS
Status: CANCELLED | OUTPATIENT
Start: 2023-04-07

## 2023-03-31 RX ORDER — SODIUM CHLORIDE 9 MG/ML
INJECTION, SOLUTION INTRAVENOUS CONTINUOUS
Status: CANCELLED | OUTPATIENT
Start: 2023-04-07

## 2023-03-31 RX ORDER — ACETAMINOPHEN 325 MG/1
650 TABLET ORAL
Status: CANCELLED | OUTPATIENT
Start: 2023-04-07

## 2023-04-07 ENCOUNTER — HOSPITAL ENCOUNTER (OUTPATIENT)
Facility: HOSPITAL | Age: 38
Setting detail: INFUSION SERIES
End: 2023-04-07
Payer: COMMERCIAL

## 2023-04-07 VITALS
HEART RATE: 68 BPM | RESPIRATION RATE: 16 BRPM | TEMPERATURE: 98.1 F | DIASTOLIC BLOOD PRESSURE: 72 MMHG | SYSTOLIC BLOOD PRESSURE: 116 MMHG | OXYGEN SATURATION: 98 %

## 2023-04-07 DIAGNOSIS — D50.0 IRON DEFICIENCY ANEMIA DUE TO CHRONIC BLOOD LOSS: Primary | ICD-10-CM

## 2023-04-07 DIAGNOSIS — D50.9 MICROCYTIC ANEMIA: ICD-10-CM

## 2023-04-07 PROCEDURE — 96366 THER/PROPH/DIAG IV INF ADDON: CPT

## 2023-04-07 PROCEDURE — 96365 THER/PROPH/DIAG IV INF INIT: CPT

## 2023-04-07 PROCEDURE — 6360000002 HC RX W HCPCS: Performed by: INTERNAL MEDICINE

## 2023-04-07 PROCEDURE — 2580000003 HC RX 258: Performed by: INTERNAL MEDICINE

## 2023-04-07 RX ORDER — EPINEPHRINE 1 MG/ML
0.3 INJECTION, SOLUTION, CONCENTRATE INTRAVENOUS PRN
Status: CANCELLED | OUTPATIENT
Start: 2023-04-21

## 2023-04-07 RX ORDER — SODIUM CHLORIDE 0.9 % (FLUSH) 0.9 %
5-40 SYRINGE (ML) INJECTION PRN
Status: CANCELLED | OUTPATIENT
Start: 2023-04-21

## 2023-04-07 RX ORDER — HEPARIN SODIUM (PORCINE) LOCK FLUSH IV SOLN 100 UNIT/ML 100 UNIT/ML
500 SOLUTION INTRAVENOUS PRN
Status: CANCELLED | OUTPATIENT
Start: 2023-04-21

## 2023-04-07 RX ORDER — SODIUM CHLORIDE 0.9 % (FLUSH) 0.9 %
5-40 SYRINGE (ML) INJECTION PRN
Status: ACTIVE | OUTPATIENT
Start: 2023-04-07 | End: 2023-04-08

## 2023-04-07 RX ORDER — ONDANSETRON 2 MG/ML
8 INJECTION INTRAMUSCULAR; INTRAVENOUS
Status: CANCELLED | OUTPATIENT
Start: 2023-04-21

## 2023-04-07 RX ORDER — DIPHENHYDRAMINE HYDROCHLORIDE 50 MG/ML
50 INJECTION INTRAMUSCULAR; INTRAVENOUS
Status: CANCELLED | OUTPATIENT
Start: 2023-04-21

## 2023-04-07 RX ORDER — SODIUM CHLORIDE 9 MG/ML
5-250 INJECTION, SOLUTION INTRAVENOUS PRN
Status: CANCELLED | OUTPATIENT
Start: 2023-04-21

## 2023-04-07 RX ORDER — SODIUM CHLORIDE 9 MG/ML
5-250 INJECTION, SOLUTION INTRAVENOUS PRN
Status: ACTIVE | OUTPATIENT
Start: 2023-04-07 | End: 2023-04-08

## 2023-04-07 RX ORDER — SODIUM CHLORIDE 9 MG/ML
INJECTION, SOLUTION INTRAVENOUS CONTINUOUS
Status: CANCELLED | OUTPATIENT
Start: 2023-04-21

## 2023-04-07 RX ORDER — ALBUTEROL SULFATE 90 UG/1
4 AEROSOL, METERED RESPIRATORY (INHALATION) PRN
Status: CANCELLED | OUTPATIENT
Start: 2023-04-21

## 2023-04-07 RX ORDER — ACETAMINOPHEN 325 MG/1
650 TABLET ORAL
Status: CANCELLED | OUTPATIENT
Start: 2023-04-21

## 2023-04-07 RX ADMIN — SODIUM CHLORIDE 25 ML/HR: 9 INJECTION, SOLUTION INTRAVENOUS at 13:23

## 2023-04-07 RX ADMIN — IRON SUCROSE 300 MG: 20 INJECTION, SOLUTION INTRAVENOUS at 13:26

## 2023-04-07 RX ADMIN — Medication 10 ML: at 15:06

## 2023-04-07 RX ADMIN — Medication 10 ML: at 13:23

## 2023-04-07 NOTE — PROGRESS NOTES
[facility] Progress Note    Date: 2023    Name: Starr Cortez    MRN: [id number]         : 1985    Venofer Infusion 1 of 3    Ms. Landry to [facility], at 1310. Pt was assessed and education was provided. Pt reported she tolerated her previous venofer infusions well in the past, most recent one in 2023. Ms. Landry's vitals were reviewed and patient was observed for 5 minutes prior to treatment. Pt politely declined written education handout. Vitals:    23 1309   BP: 117/73   Pulse: 72   Resp: 18   Temp: 97.7 °F (36.5 °C)   SpO2: 98%       24 g PIV placed in L posterior hand x 1 attempt. PIV flushed easily and had brisk blood return. NS infusing KVO throughout treatment. Venofer 300 mg was administered over approx 90 minutes as ordered. VS stable and pt denied complaints of itching, lip/tongue/facial swelling, SOB, CP or other complaints. Pt politely declined to stay for 30 minute observation period. Ms. Keyanna Alfaro tolerated the infusion, and had no complaints. VS remained stable. Patient Vitals for the past 12 hrs:   Temp Pulse Resp BP SpO2   23 1505 98.1 °F (36.7 °C) 68 16 116/72 98 %   23 1309 97.7 °F (36.5 °C) 72 18 117/73 98 %        PIV flushed with NS 10 ml and removed. No bleeding or hematoma noted at site. Gauze and coban applied. Reviewed discharge instructions with patient, including expected side effects (abdominal cramping, nausea, changes in color of urine or feces) and signs of allergic reaction requiring medical attention (itching/hives/rashes, SOB, chest pain, lip/tongue/facial swelling). Patient given printed copy to take home. Patient verbalized understanding of discharge instructions. Patient armband removed and shredded    Ms. Landry was discharged from [place] [address] in stable condition at 1510. She is to return on 2023 at 1300 for her next appointment.      Too Yanez RN  2023  1:32 PM

## 2023-04-21 ENCOUNTER — HOSPITAL ENCOUNTER (OUTPATIENT)
Facility: HOSPITAL | Age: 38
Setting detail: INFUSION SERIES
End: 2023-04-21
Payer: COMMERCIAL

## 2023-04-21 VITALS
OXYGEN SATURATION: 100 % | SYSTOLIC BLOOD PRESSURE: 117 MMHG | DIASTOLIC BLOOD PRESSURE: 74 MMHG | TEMPERATURE: 98.6 F | HEART RATE: 78 BPM | RESPIRATION RATE: 16 BRPM

## 2023-04-21 DIAGNOSIS — D50.0 IRON DEFICIENCY ANEMIA DUE TO CHRONIC BLOOD LOSS: Primary | ICD-10-CM

## 2023-04-21 DIAGNOSIS — D50.9 MICROCYTIC ANEMIA: ICD-10-CM

## 2023-04-21 PROCEDURE — 96365 THER/PROPH/DIAG IV INF INIT: CPT

## 2023-04-21 PROCEDURE — 6360000002 HC RX W HCPCS: Performed by: INTERNAL MEDICINE

## 2023-04-21 PROCEDURE — 2580000003 HC RX 258: Performed by: INTERNAL MEDICINE

## 2023-04-21 PROCEDURE — 96366 THER/PROPH/DIAG IV INF ADDON: CPT

## 2023-04-21 RX ORDER — ONDANSETRON 2 MG/ML
8 INJECTION INTRAMUSCULAR; INTRAVENOUS
Status: CANCELLED | OUTPATIENT
Start: 2023-05-05

## 2023-04-21 RX ORDER — DIPHENHYDRAMINE HYDROCHLORIDE 50 MG/ML
50 INJECTION INTRAMUSCULAR; INTRAVENOUS
Status: CANCELLED | OUTPATIENT
Start: 2023-05-05

## 2023-04-21 RX ORDER — SODIUM CHLORIDE 9 MG/ML
5-250 INJECTION, SOLUTION INTRAVENOUS PRN
Status: CANCELLED | OUTPATIENT
Start: 2023-05-05

## 2023-04-21 RX ORDER — SODIUM CHLORIDE 9 MG/ML
INJECTION, SOLUTION INTRAVENOUS CONTINUOUS
Status: CANCELLED | OUTPATIENT
Start: 2023-05-05

## 2023-04-21 RX ORDER — SODIUM CHLORIDE 9 MG/ML
5-250 INJECTION, SOLUTION INTRAVENOUS PRN
Status: ACTIVE | OUTPATIENT
Start: 2023-04-21 | End: 2023-04-22

## 2023-04-21 RX ORDER — SODIUM CHLORIDE 0.9 % (FLUSH) 0.9 %
5-40 SYRINGE (ML) INJECTION PRN
Status: CANCELLED | OUTPATIENT
Start: 2023-05-05

## 2023-04-21 RX ORDER — ALBUTEROL SULFATE 90 UG/1
4 AEROSOL, METERED RESPIRATORY (INHALATION) PRN
Status: CANCELLED | OUTPATIENT
Start: 2023-05-05

## 2023-04-21 RX ORDER — ACETAMINOPHEN 325 MG/1
650 TABLET ORAL
Status: CANCELLED | OUTPATIENT
Start: 2023-05-05

## 2023-04-21 RX ORDER — SODIUM CHLORIDE 0.9 % (FLUSH) 0.9 %
5-40 SYRINGE (ML) INJECTION PRN
Status: ACTIVE | OUTPATIENT
Start: 2023-04-21 | End: 2023-04-22

## 2023-04-21 RX ORDER — EPINEPHRINE 1 MG/ML
0.3 INJECTION, SOLUTION, CONCENTRATE INTRAVENOUS PRN
Status: CANCELLED | OUTPATIENT
Start: 2023-05-05

## 2023-04-21 RX ORDER — HEPARIN SODIUM (PORCINE) LOCK FLUSH IV SOLN 100 UNIT/ML 100 UNIT/ML
500 SOLUTION INTRAVENOUS PRN
Status: CANCELLED | OUTPATIENT
Start: 2023-05-05

## 2023-04-21 RX ADMIN — SODIUM CHLORIDE, PRESERVATIVE FREE 10 ML: 5 INJECTION INTRAVENOUS at 13:30

## 2023-04-21 RX ADMIN — IRON SUCROSE 300 MG: 20 INJECTION, SOLUTION INTRAVENOUS at 13:42

## 2023-04-21 RX ADMIN — SODIUM CHLORIDE, PRESERVATIVE FREE 10 ML: 5 INJECTION INTRAVENOUS at 15:26

## 2023-04-21 RX ADMIN — SODIUM CHLORIDE 25 ML/HR: 9 INJECTION, SOLUTION INTRAVENOUS at 13:33

## 2023-04-21 NOTE — PROGRESS NOTES
[facility] Progress Note    Date: 2023    Name: Sujatha Gtz    MRN: [id number]         : 1985    Venofer Infusion 2 of 3    Ms. Landry to [facility], ambulatory, at 13:10. Pt was assessed and education was provided. Ms. Landry's vitals were reviewed and patient was observed for 5 minutes prior to treatment. Vitals:    23 1315   BP: 112/69   Pulse: 91   Resp: 18   Temp: 99.2 °F (37.3 °C)   SpO2: 97%       24 g PIV placed in right forearm  x 1 attempt. PIV flushed easily and had brisk blood return. Venofer 300mg/250mL NS infused over approximately 90 minutes as ordered. At the completion of the infusion, VS stable and pt denied complaints of itching, lip/tongue/facial swelling, SOB, CP or other complaints. Vitals:    23 1315 23 1525   BP: 112/69 117/74   Pulse: 91 78   Resp: 18 16   Temp: 99.2 °F (37.3 °C) 98.6 °F (37 °C)   TempSrc: Oral Oral   SpO2: 97% 100%      Ms. Landry tolerated the infusion, and had no complaints. VS remained stable. PIV flushed with NS 10 ml and removed. No bleeding or hematoma noted at site. Gauze and coban applied. Patient armbands removed and shredded. Ms. Thanh Gardner was discharged from [place] [address] in stable condition at 15:30. She is to return on 2023 at 08:00 for next dose of Venofer.     Judy Cortes RN  2023  2:22 PM

## 2023-05-09 ENCOUNTER — HOSPITAL ENCOUNTER (OUTPATIENT)
Facility: HOSPITAL | Age: 38
Setting detail: INFUSION SERIES
End: 2023-05-09
Payer: COMMERCIAL

## 2023-05-09 VITALS
TEMPERATURE: 97.9 F | RESPIRATION RATE: 16 BRPM | DIASTOLIC BLOOD PRESSURE: 69 MMHG | HEART RATE: 57 BPM | OXYGEN SATURATION: 100 % | SYSTOLIC BLOOD PRESSURE: 108 MMHG

## 2023-05-09 DIAGNOSIS — D50.0 IRON DEFICIENCY ANEMIA DUE TO CHRONIC BLOOD LOSS: Primary | ICD-10-CM

## 2023-05-09 DIAGNOSIS — D50.9 MICROCYTIC ANEMIA: ICD-10-CM

## 2023-05-09 PROCEDURE — 96365 THER/PROPH/DIAG IV INF INIT: CPT

## 2023-05-09 PROCEDURE — 96366 THER/PROPH/DIAG IV INF ADDON: CPT

## 2023-05-09 PROCEDURE — 2580000003 HC RX 258: Performed by: INTERNAL MEDICINE

## 2023-05-09 PROCEDURE — 6360000002 HC RX W HCPCS: Performed by: INTERNAL MEDICINE

## 2023-05-09 RX ORDER — DIPHENHYDRAMINE HYDROCHLORIDE 50 MG/ML
50 INJECTION INTRAMUSCULAR; INTRAVENOUS
Status: CANCELLED | OUTPATIENT
Start: 2023-05-09

## 2023-05-09 RX ORDER — SODIUM CHLORIDE 0.9 % (FLUSH) 0.9 %
5-40 SYRINGE (ML) INJECTION PRN
Status: CANCELLED | OUTPATIENT
Start: 2023-05-09

## 2023-05-09 RX ORDER — SODIUM CHLORIDE 9 MG/ML
INJECTION, SOLUTION INTRAVENOUS CONTINUOUS
Status: CANCELLED | OUTPATIENT
Start: 2023-05-09

## 2023-05-09 RX ORDER — HEPARIN SODIUM (PORCINE) LOCK FLUSH IV SOLN 100 UNIT/ML 100 UNIT/ML
500 SOLUTION INTRAVENOUS PRN
Status: CANCELLED | OUTPATIENT
Start: 2023-05-09

## 2023-05-09 RX ORDER — ACETAMINOPHEN 325 MG/1
650 TABLET ORAL
Status: CANCELLED | OUTPATIENT
Start: 2023-05-09

## 2023-05-09 RX ORDER — EPINEPHRINE 1 MG/ML
0.3 INJECTION, SOLUTION, CONCENTRATE INTRAVENOUS PRN
Status: CANCELLED | OUTPATIENT
Start: 2023-05-09

## 2023-05-09 RX ORDER — SODIUM CHLORIDE 0.9 % (FLUSH) 0.9 %
5-40 SYRINGE (ML) INJECTION PRN
Status: ACTIVE | OUTPATIENT
Start: 2023-05-09 | End: 2023-05-10

## 2023-05-09 RX ORDER — ALBUTEROL SULFATE 90 UG/1
4 AEROSOL, METERED RESPIRATORY (INHALATION) PRN
Status: CANCELLED | OUTPATIENT
Start: 2023-05-09

## 2023-05-09 RX ORDER — SODIUM CHLORIDE 9 MG/ML
5-250 INJECTION, SOLUTION INTRAVENOUS PRN
Status: CANCELLED | OUTPATIENT
Start: 2023-05-09

## 2023-05-09 RX ORDER — ONDANSETRON 2 MG/ML
8 INJECTION INTRAMUSCULAR; INTRAVENOUS
Status: CANCELLED | OUTPATIENT
Start: 2023-05-09

## 2023-05-09 RX ADMIN — SODIUM CHLORIDE, PRESERVATIVE FREE 20 ML: 5 INJECTION INTRAVENOUS at 11:30

## 2023-05-09 RX ADMIN — IRON SUCROSE 400 MG: 20 INJECTION, SOLUTION INTRAVENOUS at 08:35

## 2023-05-09 RX ADMIN — SODIUM CHLORIDE, PRESERVATIVE FREE 20 ML: 5 INJECTION INTRAVENOUS at 08:30

## 2023-05-09 ASSESSMENT — PAIN - FUNCTIONAL ASSESSMENT: PAIN_FUNCTIONAL_ASSESSMENT: NONE - DENIES PAIN

## 2023-05-09 NOTE — PROGRESS NOTES
[facility]    Date: May 9, 2023    Name: Eliseo Patel    MRN: [id number]         : 1985    Ms. Landry arrived in the [facility] today at 0815, in stable condition, here for DOSE # 3 OF 3, IV VENOFER INFUSION (ORDERED EVERY 2 WEEKS X 3 DOSES). She was assessed and education was provided. Ms. Landry's vitals were reviewed. Vitals:    23 0815   BP: 111/72   Pulse: 68   Resp: 16   Temp: 97.9 °F (36.6 °C)   SpO2: 98%         Ms. Landry presented to the infusion center today stating that she was doing well and voicing no complaints. She also stated that she tolerated doses 1 & 2 of the IV VENOFER very well, with the exception of a headache that she experienced after each dose. Ms. Rachna Morse was still verbally reminded of potential side effects and adverse reactions associated with the IV VENOFER infusion, and she verbalized understanding. PIV # 25 G was established in her LEFT AC without incident, and brisk blood return was obtained. Iron Sucrose (VENOFER) 400 mg IV, was administered over approximately 2.5 hours, per order and without incident. After completion of the IV VENOFER infusion, her PIV was flushed well with NS, and then was removed and gauze//coban was applied. Ms. Rachna Morse tolerated treatment very well today, and voiced no complaints. Ms. Rachna Morse was discharged from [place] [address] in stable condition at 1135. She has no further [facility] appointments scheduled at this time, because as of today, she has completed all ordered doses of IV VENOFER. However, she is scheduled to follow up with Dr. Nicky Stiles on Friday, 23 at [address] [place] [address], and she was reminded of this appointment.      Waqas Gonzalez RN  May 9, 2023  8:48 AM

## 2023-06-29 ENCOUNTER — TELEPHONE (OUTPATIENT)
Age: 38
End: 2023-06-29

## 2023-06-29 DIAGNOSIS — D50.9 MICROCYTIC ANEMIA: ICD-10-CM

## 2023-06-29 DIAGNOSIS — D50.0 IRON DEFICIENCY ANEMIA SECONDARY TO BLOOD LOSS (CHRONIC): Primary | ICD-10-CM

## 2023-07-05 ENCOUNTER — HOSPITAL ENCOUNTER (OUTPATIENT)
Facility: HOSPITAL | Age: 38
Discharge: HOME | End: 2023-07-08
Payer: COMMERCIAL

## 2023-07-05 DIAGNOSIS — D50.9 MICROCYTIC ANEMIA: ICD-10-CM

## 2023-07-05 DIAGNOSIS — D50.0 IRON DEFICIENCY ANEMIA SECONDARY TO BLOOD LOSS (CHRONIC): ICD-10-CM

## 2023-07-05 LAB
ALBUMIN SERPL-MCNC: 3.8 G/DL (ref 3.4–5)
ALBUMIN/GLOB SERPL: 1.3 (ref 0.8–1.7)
ALP SERPL-CCNC: 67 U/L (ref 45–117)
ALT SERPL-CCNC: 19 U/L (ref 13–56)
ANION GAP SERPL CALC-SCNC: 5 MMOL/L (ref 3–18)
AST SERPL-CCNC: 15 U/L (ref 10–38)
BASOPHILS # BLD: 0 K/UL (ref 0–0.1)
BASOPHILS NFR BLD: 1 % (ref 0–2)
BILIRUB SERPL-MCNC: 0.4 MG/DL (ref 0.2–1)
BUN SERPL-MCNC: 13 MG/DL (ref 7–18)
BUN/CREAT SERPL: 18 (ref 12–20)
CALCIUM SERPL-MCNC: 8.8 MG/DL (ref 8.5–10.1)
CHLORIDE SERPL-SCNC: 110 MMOL/L (ref 100–111)
CO2 SERPL-SCNC: 24 MMOL/L (ref 21–32)
CREAT SERPL-MCNC: 0.72 MG/DL (ref 0.6–1.3)
DIFFERENTIAL METHOD BLD: ABNORMAL
EOSINOPHIL # BLD: 0.2 K/UL (ref 0–0.4)
EOSINOPHIL NFR BLD: 5 % (ref 0–5)
ERYTHROCYTE [DISTWIDTH] IN BLOOD BY AUTOMATED COUNT: 17.9 % (ref 11.6–14.5)
FERRITIN SERPL-MCNC: 17 NG/ML (ref 8–388)
GLOBULIN SER CALC-MCNC: 3 G/DL (ref 2–4)
GLUCOSE SERPL-MCNC: 94 MG/DL (ref 74–99)
HCT VFR BLD AUTO: 35.3 % (ref 35–45)
HGB BLD-MCNC: 11.3 G/DL (ref 12–16)
IMM GRANULOCYTES # BLD AUTO: 0 K/UL (ref 0–0.04)
IMM GRANULOCYTES NFR BLD AUTO: 0 % (ref 0–0.5)
IRON SATN MFR SERPL: 8 % (ref 20–50)
IRON SERPL-MCNC: 31 UG/DL (ref 50–175)
LYMPHOCYTES # BLD: 1.6 K/UL (ref 0.9–3.6)
LYMPHOCYTES NFR BLD: 33 % (ref 21–52)
MCH RBC QN AUTO: 26 PG (ref 24–34)
MCHC RBC AUTO-ENTMCNC: 32 G/DL (ref 31–37)
MCV RBC AUTO: 81.3 FL (ref 78–100)
MONOCYTES # BLD: 0.6 K/UL (ref 0.05–1.2)
MONOCYTES NFR BLD: 12 % (ref 3–10)
NEUTS SEG # BLD: 2.5 K/UL (ref 1.8–8)
NEUTS SEG NFR BLD: 50 % (ref 40–73)
NRBC # BLD: 0 K/UL (ref 0–0.01)
NRBC BLD-RTO: 0 PER 100 WBC
PLATELET # BLD AUTO: 244 K/UL (ref 135–420)
PMV BLD AUTO: 10.6 FL (ref 9.2–11.8)
POTASSIUM SERPL-SCNC: 4 MMOL/L (ref 3.5–5.5)
PROT SERPL-MCNC: 6.8 G/DL (ref 6.4–8.2)
RBC # BLD AUTO: 4.34 M/UL (ref 4.2–5.3)
SODIUM SERPL-SCNC: 139 MMOL/L (ref 136–145)
TIBC SERPL-MCNC: 405 UG/DL (ref 250–450)
WBC # BLD AUTO: 4.9 K/UL (ref 4.6–13.2)

## 2023-07-05 PROCEDURE — 85025 COMPLETE CBC W/AUTO DIFF WBC: CPT

## 2023-07-05 PROCEDURE — 83550 IRON BINDING TEST: CPT

## 2023-07-05 PROCEDURE — 80053 COMPREHEN METABOLIC PANEL: CPT

## 2023-07-05 PROCEDURE — 36415 COLL VENOUS BLD VENIPUNCTURE: CPT

## 2023-07-05 PROCEDURE — 83540 ASSAY OF IRON: CPT

## 2023-07-05 PROCEDURE — 82728 ASSAY OF FERRITIN: CPT

## 2023-07-07 ENCOUNTER — TELEMEDICINE (OUTPATIENT)
Age: 38
End: 2023-07-07
Payer: COMMERCIAL

## 2023-07-07 DIAGNOSIS — D50.0 IRON DEFICIENCY ANEMIA DUE TO CHRONIC BLOOD LOSS: Primary | ICD-10-CM

## 2023-07-07 PROCEDURE — 99442 PR PHYS/QHP TELEPHONE EVALUATION 11-20 MIN: CPT | Performed by: NURSE PRACTITIONER

## 2023-07-07 RX ORDER — DIPHENHYDRAMINE HYDROCHLORIDE 50 MG/ML
50 INJECTION INTRAMUSCULAR; INTRAVENOUS
OUTPATIENT
Start: 2023-07-07

## 2023-07-07 RX ORDER — EPINEPHRINE 1 MG/ML
0.3 INJECTION, SOLUTION, CONCENTRATE INTRAVENOUS PRN
OUTPATIENT
Start: 2023-07-07

## 2023-07-07 RX ORDER — ALBUTEROL SULFATE 90 UG/1
4 AEROSOL, METERED RESPIRATORY (INHALATION) PRN
OUTPATIENT
Start: 2023-07-07

## 2023-07-07 RX ORDER — SODIUM CHLORIDE 9 MG/ML
5-250 INJECTION, SOLUTION INTRAVENOUS PRN
OUTPATIENT
Start: 2023-07-07

## 2023-07-07 RX ORDER — ACETAMINOPHEN 325 MG/1
650 TABLET ORAL
OUTPATIENT
Start: 2023-07-07

## 2023-07-07 RX ORDER — SODIUM CHLORIDE 9 MG/ML
INJECTION, SOLUTION INTRAVENOUS CONTINUOUS
OUTPATIENT
Start: 2023-07-07

## 2023-07-07 RX ORDER — HEPARIN SODIUM (PORCINE) LOCK FLUSH IV SOLN 100 UNIT/ML 100 UNIT/ML
500 SOLUTION INTRAVENOUS PRN
OUTPATIENT
Start: 2023-07-07

## 2023-07-07 RX ORDER — FAMOTIDINE 10 MG/ML
20 INJECTION, SOLUTION INTRAVENOUS
OUTPATIENT
Start: 2023-07-07

## 2023-07-07 RX ORDER — SODIUM CHLORIDE 0.9 % (FLUSH) 0.9 %
5-40 SYRINGE (ML) INJECTION PRN
OUTPATIENT
Start: 2023-07-07

## 2023-07-07 RX ORDER — ONDANSETRON 2 MG/ML
8 INJECTION INTRAMUSCULAR; INTRAVENOUS
OUTPATIENT
Start: 2023-07-07

## 2023-07-24 ENCOUNTER — HOSPITAL ENCOUNTER (OUTPATIENT)
Facility: HOSPITAL | Age: 38
Setting detail: INFUSION SERIES
End: 2023-07-24

## 2023-12-05 PROBLEM — R10.2 PELVIC PAIN: Status: ACTIVE | Noted: 2023-12-05

## 2024-09-20 ENCOUNTER — TELEPHONE (OUTPATIENT)
Facility: CLINIC | Age: 39
End: 2024-09-20

## 2024-09-20 NOTE — TELEPHONE ENCOUNTER
Outgoing call LVM asking pt to callback to reschedule her appt that was scheduled for 9- due to provider not being in office

## 2025-03-19 ENCOUNTER — TELEPHONE (OUTPATIENT)
Facility: CLINIC | Age: 40
End: 2025-03-19

## 2025-03-19 NOTE — TELEPHONE ENCOUNTER
----- Message from Cindy RUIZ sent at 3/19/2025  1:41 PM EDT -----  Regarding: [facility] Appointment Request  [facility] Appointment Request    Patient needs appointment for [facility] Appointment Type: Annual Visit.    Patient Requested Dates(s):Any available date  Patient Requested Time: Earliest Morning  Provider Name:Renata Kelly NP-C    Reason for Appointment Request: Other The patient wanted to book an annual physical and also a follow up for the X-ray result and wanting to have lab works done.  --------------------------------------------------------------------------------------------------------------------------    Relationship to Patient: Self     Call Back Information: OK to leave message on voicemail  Preferred Call Back Number: Phone [phone]

## 2025-03-20 DIAGNOSIS — Z13.220 SCREENING FOR LIPID DISORDERS: Primary | ICD-10-CM

## 2025-03-20 DIAGNOSIS — D50.0 IRON DEFICIENCY ANEMIA DUE TO CHRONIC BLOOD LOSS: ICD-10-CM

## 2025-03-20 DIAGNOSIS — D72.819 LEUKOPENIA, UNSPECIFIED TYPE: ICD-10-CM

## 2025-03-20 DIAGNOSIS — D50.9 MICROCYTIC ANEMIA: ICD-10-CM

## 2025-03-26 ENCOUNTER — HOSPITAL ENCOUNTER (OUTPATIENT)
Facility: HOSPITAL | Age: 40
Discharge: HOME | End: 2025-03-29
Payer: COMMERCIAL

## 2025-03-26 ENCOUNTER — RESULTS FOLLOW-UP (OUTPATIENT)
Facility: CLINIC | Age: 40
End: 2025-03-26

## 2025-03-26 LAB
ALBUMIN SERPL-MCNC: 3.7 G/DL (ref 3.4–5)
ALBUMIN/GLOB SERPL: 1.1 (ref 0.8–1.7)
ALP SERPL-CCNC: 76 U/L (ref 45–117)
ALT SERPL-CCNC: 31 U/L (ref 13–56)
ANION GAP SERPL CALC-SCNC: 7 MMOL/L (ref 3–18)
AST SERPL-CCNC: 24 U/L (ref 10–38)
BASOPHILS # BLD: 0.02 K/UL (ref 0–0.1)
BASOPHILS NFR BLD: 0.5 % (ref 0–2)
BILIRUB SERPL-MCNC: 0.9 MG/DL (ref 0.2–1)
BUN SERPL-MCNC: 10 MG/DL (ref 7–18)
BUN/CREAT SERPL: 16 (ref 12–20)
CALCIUM SERPL-MCNC: 8.6 MG/DL (ref 8.5–10.1)
CHLORIDE SERPL-SCNC: 109 MMOL/L (ref 100–111)
CHOLEST SERPL-MCNC: 167 MG/DL
CO2 SERPL-SCNC: 24 MMOL/L (ref 21–32)
CREAT SERPL-MCNC: 0.63 MG/DL (ref 0.6–1.3)
DIFFERENTIAL METHOD BLD: ABNORMAL
EOSINOPHIL # BLD: 0.15 K/UL (ref 0–0.4)
EOSINOPHIL NFR BLD: 4 % (ref 0–5)
ERYTHROCYTE [DISTWIDTH] IN BLOOD BY AUTOMATED COUNT: 12.3 % (ref 11.6–14.5)
GLOBULIN SER CALC-MCNC: 3.3 G/DL (ref 2–4)
GLUCOSE SERPL-MCNC: 83 MG/DL (ref 74–99)
HCT VFR BLD AUTO: 40.9 % (ref 35–45)
HDLC SERPL-MCNC: 43 MG/DL (ref 40–60)
HDLC SERPL: 3.9 (ref 0–5)
HGB BLD-MCNC: 13.5 G/DL (ref 12–16)
IMM GRANULOCYTES # BLD AUTO: 0 K/UL (ref 0–0.04)
IMM GRANULOCYTES NFR BLD AUTO: 0 % (ref 0–0.5)
LDLC SERPL CALC-MCNC: 105.2 MG/DL (ref 0–100)
LIPID PANEL: ABNORMAL
LYMPHOCYTES # BLD: 1.64 K/UL (ref 0.9–3.6)
LYMPHOCYTES NFR BLD: 43.7 % (ref 21–52)
MCH RBC QN AUTO: 29.2 PG (ref 24–34)
MCHC RBC AUTO-ENTMCNC: 33 G/DL (ref 31–37)
MCV RBC AUTO: 88.5 FL (ref 78–100)
MONOCYTES # BLD: 0.44 K/UL (ref 0.05–1.2)
MONOCYTES NFR BLD: 11.7 % (ref 3–10)
NEUTS SEG # BLD: 1.5 K/UL (ref 1.8–8)
NEUTS SEG NFR BLD: 40.1 % (ref 40–73)
NRBC # BLD: 0 K/UL (ref 0–0.01)
NRBC BLD-RTO: 0 PER 100 WBC
PLATELET # BLD AUTO: 215 K/UL (ref 135–420)
PMV BLD AUTO: 10.3 FL (ref 9.2–11.8)
POTASSIUM SERPL-SCNC: 3.9 MMOL/L (ref 3.5–5.5)
PROT SERPL-MCNC: 7 G/DL (ref 6.4–8.2)
RBC # BLD AUTO: 4.62 M/UL (ref 4.2–5.3)
SODIUM SERPL-SCNC: 140 MMOL/L (ref 136–145)
TRIGL SERPL-MCNC: 94 MG/DL
VLDLC SERPL CALC-MCNC: 18.8 MG/DL
WBC # BLD AUTO: 3.8 K/UL (ref 4.6–13.2)

## 2025-03-26 PROCEDURE — 80061 LIPID PANEL: CPT

## 2025-03-26 PROCEDURE — 36415 COLL VENOUS BLD VENIPUNCTURE: CPT

## 2025-03-26 PROCEDURE — 80053 COMPREHEN METABOLIC PANEL: CPT

## 2025-03-26 PROCEDURE — 85025 COMPLETE CBC W/AUTO DIFF WBC: CPT

## 2025-03-30 PROBLEM — E78.00 HYPERCHOLESTEROLEMIA: Status: ACTIVE | Noted: 2025-03-30

## 2025-03-30 NOTE — PROGRESS NOTES
Chief Complaint   Patient presents with    Anemia    Leukopenia    Hyperlipidemia    Pneumonia     Pt had pnu in August 2025 DX at [facility] and they found she has a lung nodule      Assessment & Plan        .1. Lung nodule  Assessment & Plan:  Ordered CT of chest to evaluate lung nodule.   Asymptomatic, consult pulmonary    9/5/24 chest xray via [facility]: Chest PA/Lat- Two views. Compared to 08.29.04, Mild cardiomegaly.   No focal consolidation evident. A 9-mm nodular opacity is demonstrated in the upper retrosternal region on the lateral view and is stable when compared to 10.24.23 suggested a non aggressive etiology. Additional follow up recommended.   Orders:  -     CT CHEST W WO CONTRAST; Future  -     CBC with Auto Differential; Future  -     Amb External Referral To Pulmonology  2. Leukopenia, unspecified type  Assessment & Plan:  New onset leukopenia with WBC 3.8, asymptomatic.  - Repeat CBC in 3 months.  - Referral to hematology if WBC worsens.  3. Hypercholesterolemia  Assessment & Plan:  LDL mildly elevated at 105.  - Continue lifestyle modifications.  - Repeat lipid panel in 6 months.  4. S/P total hysterectomy  Assessment & Plan:  S/p total hysterectomy on 12/5/23, continue to f/u with gynecology, [facility]  Continue OTC estoven tablets for hot flashes   Orders:  -     Rhubarb (ESTROVEN COMPLETE) 4 MG TABS; Take 1 tablet by mouth daily, Disp-90 tablet, R-1NO PRINT  5. Microcytic anemia  Assessment & Plan:  Resolved s/p total hysterectomy, H/H WNL  6. Iron deficiency anemia due to chronic blood loss  Assessment & Plan:  Resolved s/p total hysterectomy, H/H WNL  7. Uterine leiomyoma, unspecified location  Assessment & Plan:  Resolved s/p total hysterectomy, H/H WNL  8. Encounter for screening mammogram for breast cancer  -     ARLENE ASHWIN DIGITAL SCREEN BILATERAL; Future  9. Encounter to discuss test results  Comments:  Discussed 3/26/25 lab

## 2025-03-31 ENCOUNTER — OFFICE VISIT (OUTPATIENT)
Facility: CLINIC | Age: 40
End: 2025-03-31
Payer: COMMERCIAL

## 2025-03-31 VITALS
RESPIRATION RATE: 14 BRPM | DIASTOLIC BLOOD PRESSURE: 84 MMHG | HEIGHT: 62 IN | OXYGEN SATURATION: 97 % | SYSTOLIC BLOOD PRESSURE: 134 MMHG | TEMPERATURE: 97.6 F | WEIGHT: 198 LBS | HEART RATE: 70 BPM | BODY MASS INDEX: 36.44 KG/M2

## 2025-03-31 DIAGNOSIS — R91.1 LUNG NODULE: Primary | ICD-10-CM

## 2025-03-31 DIAGNOSIS — E78.00 HYPERCHOLESTEROLEMIA: ICD-10-CM

## 2025-03-31 DIAGNOSIS — D50.9 MICROCYTIC ANEMIA: ICD-10-CM

## 2025-03-31 DIAGNOSIS — D72.819 LEUKOPENIA, UNSPECIFIED TYPE: ICD-10-CM

## 2025-03-31 DIAGNOSIS — Z71.2 ENCOUNTER TO DISCUSS TEST RESULTS: ICD-10-CM

## 2025-03-31 DIAGNOSIS — D50.0 IRON DEFICIENCY ANEMIA DUE TO CHRONIC BLOOD LOSS: ICD-10-CM

## 2025-03-31 DIAGNOSIS — Z90.710 S/P TOTAL HYSTERECTOMY: ICD-10-CM

## 2025-03-31 DIAGNOSIS — Z12.31 ENCOUNTER FOR SCREENING MAMMOGRAM FOR BREAST CANCER: ICD-10-CM

## 2025-03-31 DIAGNOSIS — D25.9 UTERINE LEIOMYOMA, UNSPECIFIED LOCATION: ICD-10-CM

## 2025-03-31 PROBLEM — R10.2 PELVIC PAIN: Status: RESOLVED | Noted: 2023-12-05 | Resolved: 2025-03-31

## 2025-03-31 PROCEDURE — 99214 OFFICE O/P EST MOD 30 MIN: CPT

## 2025-03-31 RX ORDER — SOY ISOFLAV/BCOHOSH/CISSUS QUA 198 MG
1 CAPSULE ORAL DAILY
Qty: 90 TABLET | Refills: 1
Start: 2025-03-31

## 2025-03-31 SDOH — ECONOMIC STABILITY: FOOD INSECURITY: WITHIN THE PAST 12 MONTHS, YOU WORRIED THAT YOUR FOOD WOULD RUN OUT BEFORE YOU GOT MONEY TO BUY MORE.: PATIENT DECLINED

## 2025-03-31 SDOH — ECONOMIC STABILITY: FOOD INSECURITY: WITHIN THE PAST 12 MONTHS, THE FOOD YOU BOUGHT JUST DIDN'T LAST AND YOU DIDN'T HAVE MONEY TO GET MORE.: PATIENT DECLINED

## 2025-03-31 ASSESSMENT — PATIENT HEALTH QUESTIONNAIRE - PHQ9
SUM OF ALL RESPONSES TO PHQ QUESTIONS 1-9: 0
1. LITTLE INTEREST OR PLEASURE IN DOING THINGS: NOT AT ALL
2. FEELING DOWN, DEPRESSED OR HOPELESS: NOT AT ALL
SUM OF ALL RESPONSES TO PHQ QUESTIONS 1-9: 0

## 2025-03-31 ASSESSMENT — ENCOUNTER SYMPTOMS
WHEEZING: 0
CHEST TIGHTNESS: 0
COUGH: 0

## 2025-03-31 NOTE — ASSESSMENT & PLAN NOTE
Ordered CT of chest to evaluate lung nodule.   Asymptomatic, consult pulmonary    9/5/24 chest xray via [facility]: Chest PA/Lat- Two views. Compared to 08.29.04, Mild cardiomegaly.   No focal consolidation evident. A 9-mm nodular opacity is demonstrated in the upper retrosternal region on the lateral view and is stable when compared to 10.24.23 suggested a non aggressive etiology. Additional follow up recommended.

## 2025-03-31 NOTE — ASSESSMENT & PLAN NOTE
LDL mildly elevated at 105.  - Continue lifestyle modifications.  - Repeat lipid panel in 6 months.

## 2025-03-31 NOTE — PROGRESS NOTES
Cornelia Landry presents today for   Chief Complaint   Patient presents with    Anemia    Leukopenia    Hyperlipidemia    Pneumonia     Pt had pnu in 2025 DX at [facility] and they found she has a lung nodule        Is someone accompanying this pt? no    Is the patient using any DME equipment during OV? no    Depression Screenin/11/2022     8:45 AM 10/10/2022     1:09 PM   PHQ-9 Questionaire   Little interest or pleasure in doing things 0 0   Feeling down, depressed, or hopeless 0 0   PHQ-9 Total Score 0 0        COURTNEY 7-Anxiety        No data to display                   Learning Assessment:  No question data found.     Fall Risk      2023     8:26 AM 2023     1:15 PM 2023     1:10 PM   FALL RISK   Outpatient population Patient seen multiple times at site for continuous  treatment (Series) Patient seen multiple times at site for continuous  treatment (Series);Patient receiving medication during  outpatient visit Patient seen multiple times at site for continuous  treatment (Series)   History of falling with or without injury No No No   Use of ambulatory aid No No No   Difficulty walking/impaired gait No No No   Numbness in feet No No No   Vision changes No No No   Dizziness No No No   Shortness of breath No No No   Current medications include but not limited to Not applicable Not applicable ACE, ARB, Beta  Blocker   Other fall risk  No No No   Outpatient fall risk intervention strategies Fall risk education provided Fall risk education provided Fall risk education provided          Travel Screening:    Travel Screening     No screening recorded since 25 0000       Travel History   Travel since 25    No documented travel since 25            Health Maintenance reviewed and discussed and ordered per Provider.  Transportation Needs: No Transportation Needs (2023)    PRAPARE - Transportation     Lack of Transportation (Medical): No     Lack of Transportation

## 2025-03-31 NOTE — ASSESSMENT & PLAN NOTE
S/p total hysterectomy on 12/5/23, continue to f/u with gynecology, [facility]  Continue OTC estoven tablets for hot flashes

## 2025-03-31 NOTE — ASSESSMENT & PLAN NOTE
New onset leukopenia with WBC 3.8, asymptomatic.  - Repeat CBC in 3 months.  - Referral to hematology if WBC worsens.

## 2025-04-08 ENCOUNTER — HOSPITAL ENCOUNTER (OUTPATIENT)
Facility: HOSPITAL | Age: 40
Discharge: HOME | End: 2025-04-11
Payer: COMMERCIAL

## 2025-04-08 DIAGNOSIS — R91.1 LUNG NODULE: ICD-10-CM

## 2025-04-08 PROCEDURE — 71260 CT THORAX DX C+: CPT

## 2025-04-08 PROCEDURE — 6360000004 HC RX CONTRAST MEDICATION

## 2025-04-08 RX ORDER — IOPAMIDOL 612 MG/ML
80 INJECTION, SOLUTION INTRAVASCULAR
Status: COMPLETED | OUTPATIENT
Start: 2025-04-08 | End: 2025-04-08

## 2025-04-08 RX ADMIN — IOPAMIDOL 71 ML: 612 INJECTION, SOLUTION INTRAVENOUS at 08:59

## 2025-04-11 ENCOUNTER — RESULTS FOLLOW-UP (OUTPATIENT)
Facility: CLINIC | Age: 40
End: 2025-04-11

## 2025-04-11 NOTE — TELEPHONE ENCOUNTER
----- Message from MATEUSZ Ceja sent at 4/11/2025  9:24 AM EDT -----  CT scan confirmed lung nodule with calcified lymph nodes.  Follow up as planned with pulmonary, please process referral and advise patient to schedule.  Also incidental finding of uneven area of right breast, complete mammogram as ordered by PCP.  Follow up as scheduled on 04/28/2025 with Renata.  Thank you.

## 2025-06-23 ENCOUNTER — HOSPITAL ENCOUNTER (OUTPATIENT)
Facility: HOSPITAL | Age: 40
Discharge: HOME | End: 2025-06-26
Payer: COMMERCIAL

## 2025-06-23 VITALS — BODY MASS INDEX: 36.44 KG/M2 | WEIGHT: 198 LBS | HEIGHT: 62 IN

## 2025-06-23 DIAGNOSIS — Z12.31 ENCOUNTER FOR SCREENING MAMMOGRAM FOR BREAST CANCER: ICD-10-CM

## 2025-06-23 PROCEDURE — 77063 BREAST TOMOSYNTHESIS BI: CPT

## 2025-06-26 ENCOUNTER — RESULTS FOLLOW-UP (OUTPATIENT)
Facility: CLINIC | Age: 40
End: 2025-06-26

## 2025-06-30 ENCOUNTER — HOSPITAL ENCOUNTER (OUTPATIENT)
Age: 40
Discharge: HOME | End: 2025-06-30
Payer: COMMERCIAL

## 2025-06-30 DIAGNOSIS — R91.1 LUNG NODULE: ICD-10-CM

## 2025-06-30 LAB
BASOPHILS # BLD: 0.03 K/UL (ref 0–0.1)
BASOPHILS NFR BLD: 0.8 % (ref 0–2)
DIFFERENTIAL METHOD BLD: ABNORMAL
EOSINOPHIL # BLD: 0.14 K/UL (ref 0–0.4)
EOSINOPHIL NFR BLD: 3.5 % (ref 0–5)
ERYTHROCYTE [DISTWIDTH] IN BLOOD BY AUTOMATED COUNT: 12.5 % (ref 11.6–14.5)
HCT VFR BLD AUTO: 40.9 % (ref 35–45)
HGB BLD-MCNC: 13.6 G/DL (ref 12–16)
IMM GRANULOCYTES # BLD AUTO: 0.01 K/UL (ref 0–0.04)
IMM GRANULOCYTES NFR BLD AUTO: 0.3 % (ref 0–0.5)
LYMPHOCYTES # BLD: 1.64 K/UL (ref 0.9–3.6)
LYMPHOCYTES NFR BLD: 41.3 % (ref 21–52)
MCH RBC QN AUTO: 28.9 PG (ref 24–34)
MCHC RBC AUTO-ENTMCNC: 33.3 G/DL (ref 31–37)
MCV RBC AUTO: 86.8 FL (ref 78–100)
MONOCYTES # BLD: 0.45 K/UL (ref 0.05–1.2)
MONOCYTES NFR BLD: 11.3 % (ref 3–10)
NEUTS SEG # BLD: 1.7 K/UL (ref 1.8–8)
NEUTS SEG NFR BLD: 42.8 % (ref 40–73)
NRBC # BLD: 0 K/UL (ref 0–0.01)
NRBC BLD-RTO: 0 PER 100 WBC
PLATELET # BLD AUTO: 213 K/UL (ref 135–420)
PMV BLD AUTO: 10.4 FL (ref 9.2–11.8)
RBC # BLD AUTO: 4.71 M/UL (ref 4.2–5.3)
WBC # BLD AUTO: 4 K/UL (ref 4.6–13.2)

## 2025-06-30 PROCEDURE — 36415 COLL VENOUS BLD VENIPUNCTURE: CPT

## 2025-06-30 PROCEDURE — 85025 COMPLETE CBC W/AUTO DIFF WBC: CPT

## 2025-07-01 NOTE — ASSESSMENT & PLAN NOTE
4/8/25 CT of chest:  1. Calcified right upper lobe 1 cm nodule with multiple mediastinal calcified lymph nodes, suggestive of prior infectious granulomatous disease or inflammatory process.  CT of chest confirmed lung nodule  Remains asymptomatic  Pulmonary referral ordered on 3/31/25, has not established care  Provided contact information, and advised to call office and schedule appt

## 2025-07-01 NOTE — ASSESSMENT & PLAN NOTE
Slight improvement, increased from 3.8 to 4.0  Remains low  Asymptomatic  Consult hematology for management     Orders:    Amb External Referral To Hematology Oncology

## 2025-07-01 NOTE — PROGRESS NOTES
Chief Complaint   Patient presents with    Leukopenia    Pulmonary Nodule    Discuss Labs                  Assessment & Plan  Leukopenia, unspecified type  Slight improvement, increased from 3.8 to 4.0  Remains low  Asymptomatic  Consult hematology for management     Orders:    Amb External Referral To Hematology Oncology    Lung nodule  4/8/25 CT of chest:  1. Calcified right upper lobe 1 cm nodule with multiple mediastinal calcified lymph nodes, suggestive of prior infectious granulomatous disease or inflammatory process.  CT of chest confirmed lung nodule  Remains asymptomatic  Pulmonary referral ordered on 3/31/25, has not established care  Provided contact information, and advised to call office and schedule appt         Encounter to discuss test results  Discussed 6/30/25 lab results         Screen for colon cancer            Numerous moles  Development of multiple nevi on neck and over eye.  No changes in size, shape, or color of nevi.  Referral to [facility] for comprehensive skin examination.  Family history of skin condition in father.  Orders:    Amb External Referral To Dermatology    Family history of skin cancer  Development of multiple nevi on neck and over eye.  No changes in size, shape, or color of nevi.  Referral to [facility] for comprehensive skin examination.  Family history of skin condition in father.    Orders:    Amb External Referral To Dermatology      Follow-up and Dispositions    Return in about 6 months (around 1/2/2026) for leukopenia, lung nodules, moles, no labs.       Subjective:     History of Present Illness  The patient presents for evaluation of leukopenia, multiple nevi, and weight gain.    Leukopenia  - She has not yet reviewed her recent lab results.  - She has previously consulted a hematologist for an iron transfusion.  - She reports no fevers, chills, night sweats, or recent infections.  - She has a history of pneumonia, COVID-19, and bronchitis,

## 2025-07-02 ENCOUNTER — OFFICE VISIT (OUTPATIENT)
Facility: CLINIC | Age: 40
End: 2025-07-02
Payer: COMMERCIAL

## 2025-07-02 VITALS
HEIGHT: 62 IN | DIASTOLIC BLOOD PRESSURE: 82 MMHG | WEIGHT: 200 LBS | OXYGEN SATURATION: 96 % | SYSTOLIC BLOOD PRESSURE: 120 MMHG | RESPIRATION RATE: 14 BRPM | BODY MASS INDEX: 36.8 KG/M2 | TEMPERATURE: 97.3 F | HEART RATE: 67 BPM

## 2025-07-02 DIAGNOSIS — Z12.11 SCREEN FOR COLON CANCER: ICD-10-CM

## 2025-07-02 DIAGNOSIS — Z71.2 ENCOUNTER TO DISCUSS TEST RESULTS: ICD-10-CM

## 2025-07-02 DIAGNOSIS — D22.9 NUMEROUS MOLES: ICD-10-CM

## 2025-07-02 DIAGNOSIS — D72.819 LEUKOPENIA, UNSPECIFIED TYPE: Primary | ICD-10-CM

## 2025-07-02 DIAGNOSIS — Z80.8 FAMILY HISTORY OF SKIN CANCER: ICD-10-CM

## 2025-07-02 DIAGNOSIS — R91.1 LUNG NODULE: ICD-10-CM

## 2025-07-02 PROCEDURE — 99214 OFFICE O/P EST MOD 30 MIN: CPT

## 2025-07-02 SDOH — ECONOMIC STABILITY: FOOD INSECURITY: WITHIN THE PAST 12 MONTHS, THE FOOD YOU BOUGHT JUST DIDN'T LAST AND YOU DIDN'T HAVE MONEY TO GET MORE.: NEVER TRUE

## 2025-07-02 SDOH — ECONOMIC STABILITY: INCOME INSECURITY: IN THE LAST 12 MONTHS, WAS THERE A TIME WHEN YOU WERE NOT ABLE TO PAY THE MORTGAGE OR RENT ON TIME?: NO

## 2025-07-02 SDOH — ECONOMIC STABILITY: FOOD INSECURITY: WITHIN THE PAST 12 MONTHS, YOU WORRIED THAT YOUR FOOD WOULD RUN OUT BEFORE YOU GOT MONEY TO BUY MORE.: NEVER TRUE

## 2025-07-02 SDOH — ECONOMIC STABILITY: TRANSPORTATION INSECURITY
IN THE PAST 12 MONTHS, HAS THE LACK OF TRANSPORTATION KEPT YOU FROM MEDICAL APPOINTMENTS OR FROM GETTING MEDICATIONS?: NO

## 2025-07-02 SDOH — ECONOMIC STABILITY: TRANSPORTATION INSECURITY
IN THE PAST 12 MONTHS, HAS LACK OF TRANSPORTATION KEPT YOU FROM MEETINGS, WORK, OR FROM GETTING THINGS NEEDED FOR DAILY LIVING?: NO

## 2025-07-02 ASSESSMENT — PATIENT HEALTH QUESTIONNAIRE - PHQ9
SUM OF ALL RESPONSES TO PHQ QUESTIONS 1-9: 0
2. FEELING DOWN, DEPRESSED OR HOPELESS: NOT AT ALL
SUM OF ALL RESPONSES TO PHQ QUESTIONS 1-9: 0
1. LITTLE INTEREST OR PLEASURE IN DOING THINGS: NOT AT ALL

## 2025-07-02 NOTE — PROGRESS NOTES
Cornelia Landry presents today for   Chief Complaint   Patient presents with    Leukopenia    Pulmonary Nodule    Discuss Labs       HPI     Is someone accompanying this pt? no    Is the patient using any DME equipment during OV? no    Depression Screenin/2/2025     8:03 AM 3/31/2025    11:41 AM 2022     8:45 AM 10/10/2022     1:09 PM   PHQ-9 Questionaire   Little interest or pleasure in doing things 0 0 0 0   Feeling down, depressed, or hopeless 0 0 0 0   PHQ-9 Total Score 0 0 0 0        COURTNEY 7-Anxiety        No data to display                   Learning Assessment:  No question data found.     Fall Risk      2023     8:26 AM 2023     1:15 PM 2023     1:10 PM   FALL RISK   Outpatient population Patient seen multiple times at site for continuous  treatment (Series) Patient seen multiple times at site for continuous  treatment (Series);Patient receiving medication during  outpatient visit Patient seen multiple times at site for continuous  treatment (Series)   History of falling with or without injury No No No   Use of ambulatory aid No No No   Difficulty walking/impaired gait No No No   Numbness in feet No No No   Vision changes No No No   Dizziness No No No   Shortness of breath No No No   Current medications include but not limited to Not applicable Not applicable ACE, ARB, Beta  Blocker   Other fall risk  No No No   Outpatient fall risk intervention strategies Fall risk education provided Fall risk education provided Fall risk education provided          Travel Screening:    Travel Screening     No screening recorded since 25 0000       Travel History   Travel since 25    No documented travel since 25            Health Maintenance reviewed and discussed and ordered per Provider.  Transportation Needs: No Transportation Needs (2025)    PRAPARE - Transportation     Lack of Transportation (Medical): No     Lack of Transportation (Non-Medical): No      Food

## 2025-07-03 ASSESSMENT — ENCOUNTER SYMPTOMS: SHORTNESS OF BREATH: 0
